# Patient Record
Sex: FEMALE | HISPANIC OR LATINO | Employment: STUDENT | URBAN - METROPOLITAN AREA
[De-identification: names, ages, dates, MRNs, and addresses within clinical notes are randomized per-mention and may not be internally consistent; named-entity substitution may affect disease eponyms.]

---
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
## 2022-10-15 PROCEDURE — 99285 EMERGENCY DEPT VISIT HI MDM: CPT | Mod: 25

## 2022-10-16 ENCOUNTER — HOSPITAL ENCOUNTER (EMERGENCY)
Facility: CLINIC | Age: 22
Discharge: HOME | End: 2022-10-16
Attending: EMERGENCY MEDICINE | Admitting: EMERGENCY MEDICINE
Payer: COMMERCIAL

## 2022-10-16 ENCOUNTER — APPOINTMENT (OUTPATIENT)
Dept: ULTRASOUND IMAGING | Facility: CLINIC | Age: 22
End: 2022-10-16
Attending: EMERGENCY MEDICINE
Payer: COMMERCIAL

## 2022-10-16 ENCOUNTER — APPOINTMENT (OUTPATIENT)
Dept: CT IMAGING | Facility: CLINIC | Age: 22
End: 2022-10-16
Attending: EMERGENCY MEDICINE
Payer: COMMERCIAL

## 2022-10-16 VITALS
DIASTOLIC BLOOD PRESSURE: 75 MMHG | HEART RATE: 60 BPM | SYSTOLIC BLOOD PRESSURE: 136 MMHG | RESPIRATION RATE: 16 BRPM | TEMPERATURE: 98.6 F | OXYGEN SATURATION: 99 %

## 2022-10-16 DIAGNOSIS — R10.31 ABDOMINAL PAIN, RIGHT LOWER QUADRANT: ICD-10-CM

## 2022-10-16 LAB
ALBUMIN SERPL BCG-MCNC: 4.2 G/DL (ref 3.5–5.2)
ALBUMIN UR-MCNC: NEGATIVE MG/DL
ALP SERPL-CCNC: 110 U/L (ref 35–104)
ALT SERPL W P-5'-P-CCNC: 21 U/L (ref 10–35)
ANION GAP SERPL CALCULATED.3IONS-SCNC: 10 MMOL/L (ref 7–15)
APPEARANCE UR: CLEAR
AST SERPL W P-5'-P-CCNC: 23 U/L (ref 10–35)
BILIRUB SERPL-MCNC: 0.2 MG/DL
BILIRUB UR QL STRIP: NEGATIVE
BUN SERPL-MCNC: 15.7 MG/DL (ref 6–20)
CALCIUM SERPL-MCNC: 9.1 MG/DL (ref 8.6–10)
CHLORIDE SERPL-SCNC: 106 MMOL/L (ref 98–107)
COLOR UR AUTO: NORMAL
CREAT SERPL-MCNC: 0.87 MG/DL (ref 0.51–0.95)
DEPRECATED HCO3 PLAS-SCNC: 24 MMOL/L (ref 22–29)
ERYTHROCYTE [DISTWIDTH] IN BLOOD BY AUTOMATED COUNT: 13.4 % (ref 10–15)
GFR SERPL CREATININE-BSD FRML MDRD: >90 ML/MIN/1.73M2
GLUCOSE SERPL-MCNC: 103 MG/DL (ref 70–99)
GLUCOSE UR STRIP-MCNC: NEGATIVE MG/DL
HCG SERPL QL: NEGATIVE
HCT VFR BLD AUTO: 42.5 % (ref 35–47)
HGB BLD-MCNC: 13.2 G/DL (ref 11.7–15.7)
HGB UR QL STRIP: NEGATIVE
HOLD SPECIMEN: NORMAL
KETONES UR STRIP-MCNC: NEGATIVE MG/DL
LEUKOCYTE ESTERASE UR QL STRIP: NEGATIVE
MCH RBC QN AUTO: 26.5 PG (ref 26.5–33)
MCHC RBC AUTO-ENTMCNC: 31.1 G/DL (ref 31.5–36.5)
MCV RBC AUTO: 85 FL (ref 78–100)
NITRATE UR QL: NEGATIVE
PH UR STRIP: 6 [PH] (ref 5–7)
PLATELET # BLD AUTO: 233 10E3/UL (ref 150–450)
POTASSIUM SERPL-SCNC: 3.9 MMOL/L (ref 3.4–5.3)
PROT SERPL-MCNC: 7.3 G/DL (ref 6.4–8.3)
RBC # BLD AUTO: 4.98 10E6/UL (ref 3.8–5.2)
RBC URINE: <1 /HPF
SODIUM SERPL-SCNC: 140 MMOL/L (ref 136–145)
SP GR UR STRIP: 1.03 (ref 1–1.03)
SQUAMOUS EPITHELIAL: 1 /HPF
UROBILINOGEN UR STRIP-MCNC: NORMAL MG/DL
WBC # BLD AUTO: 9.7 10E3/UL (ref 4–11)
WBC URINE: <1 /HPF

## 2022-10-16 PROCEDURE — 81001 URINALYSIS AUTO W/SCOPE: CPT | Performed by: EMERGENCY MEDICINE

## 2022-10-16 PROCEDURE — 84703 CHORIONIC GONADOTROPIN ASSAY: CPT | Performed by: EMERGENCY MEDICINE

## 2022-10-16 PROCEDURE — 85027 COMPLETE CBC AUTOMATED: CPT | Performed by: EMERGENCY MEDICINE

## 2022-10-16 PROCEDURE — 250N000009 HC RX 250: Performed by: EMERGENCY MEDICINE

## 2022-10-16 PROCEDURE — 80053 COMPREHEN METABOLIC PANEL: CPT | Performed by: EMERGENCY MEDICINE

## 2022-10-16 PROCEDURE — 36415 COLL VENOUS BLD VENIPUNCTURE: CPT | Performed by: EMERGENCY MEDICINE

## 2022-10-16 PROCEDURE — 250N000011 HC RX IP 250 OP 636: Performed by: EMERGENCY MEDICINE

## 2022-10-16 PROCEDURE — 74177 CT ABD & PELVIS W/CONTRAST: CPT

## 2022-10-16 PROCEDURE — 76830 TRANSVAGINAL US NON-OB: CPT

## 2022-10-16 RX ORDER — IOPAMIDOL 755 MG/ML
500 INJECTION, SOLUTION INTRAVASCULAR ONCE
Status: COMPLETED | OUTPATIENT
Start: 2022-10-16 | End: 2022-10-16

## 2022-10-16 RX ADMIN — SODIUM CHLORIDE 65 ML: 9 INJECTION, SOLUTION INTRAVENOUS at 03:06

## 2022-10-16 RX ADMIN — IOPAMIDOL 100 ML: 755 INJECTION, SOLUTION INTRAVENOUS at 03:06

## 2022-10-16 ASSESSMENT — ACTIVITIES OF DAILY LIVING (ADL): ADLS_ACUITY_SCORE: 35

## 2022-10-17 ASSESSMENT — ENCOUNTER SYMPTOMS
VOMITING: 0
FLANK PAIN: 0
DIARRHEA: 0
ABDOMINAL PAIN: 1
COUGH: 0
FEVER: 0
SHORTNESS OF BREATH: 0
DYSURIA: 0
NAUSEA: 0
CHILLS: 0

## 2022-10-17 NOTE — ED PROVIDER NOTES
History     Chief Complaint:  Abdominal Pain      HPI   Kelsi Bennett is a 22 year old female who presents with chief complaint right lower quadrant abdominal pain.  Patient reports symptoms began a few hours ago.  She denies similar pain in the past.  She describes pain as sharp and squeezing.  Denies nausea, vomiting, diarrhea.  Denies vaginal bleeding.  Denies vaginal discharge.  Last period is unknown as she has an IUD in.  No fever/chills.  No other complaints.    Review of Systems   Constitutional: Negative for chills and fever.   HENT: Negative for congestion.    Respiratory: Negative for cough and shortness of breath.    Cardiovascular: Negative for chest pain.   Gastrointestinal: Positive for abdominal pain. Negative for diarrhea, nausea and vomiting.   Genitourinary: Negative for dysuria, flank pain, pelvic pain, vaginal bleeding and vaginal discharge.   All other systems reviewed and are negative.        Allergies:  No Known Allergies    Medications:    levonorgestrel (KYLEENA) 19.5 MG IUD         Past Medical History:   Past Surgical History:     No significant pmhx Past Surgical History:   Procedure Laterality Date     NO HISTORY OF SURGERY        Patient Active Problem List    Diagnosis Date Noted     IUD (intrauterine device) in place - Kyleena - inserted 6/2020 08/05/2021     Priority: Medium     Morbid obesity (H) 08/05/2021     Priority: Medium          Family History  Family History   Adopted: Yes   Problem Relation Age of Onset     No Known Problems Father      No Known Problems Mother      No Known Problems Maternal Grandmother      No Known Problems Maternal Grandfather      No Known Problems Paternal Grandmother      No Known Problems Other        Social History:  PCP: Magdalena Carson  Presents to the ED with parents  Nonsmoker/nondrinker.     Physical Exam       Physical Exam  Nursing note and vitals reviewed.  HENT:   Mouth/Throat: Moist mucous membranes.   Eyes: EOMI, nonicteric  sclera  Cardiovascular: Normal rate, regular rhythm, no murmurs, rubs, or gallops  Pulmonary/Chest: Effort normal and breath sounds normal. No respiratory distress. No wheezes. No rales.   Abdominal: Soft. TTP RLQ, nondistended, no guarding or rigidity.   Musculoskeletal: Normal range of motion.   Neurological: Alert. Moves all extremities spontaneously.   Skin: Skin is warm and dry. No rash noted.   Psychiatric: Normal mood and affect.       Emergency Department Course     Imaging:  US Pelvis Cmplt w Transvag & Doppler LmtPel Duplex Limited   Final Result   IMPRESSION:     1.  Normal pelvis with an IUD in place.               Abd/pelvis CT,  IV  contrast only TRAUMA / AAA   Final Result   IMPRESSION:    1.  No acute abnormality. No appendicitis or other bowel inflammation or obstruction.          Laboratory:  Labs Ordered and Resulted from Time of ED Arrival to Time of ED Departure   CBC WITH PLATELETS - Abnormal       Result Value    WBC Count 9.7      RBC Count 4.98      Hemoglobin 13.2      Hematocrit 42.5      MCV 85      MCH 26.5      MCHC 31.1 (*)     RDW 13.4      Platelet Count 233     COMPREHENSIVE METABOLIC PANEL - Abnormal    Sodium 140      Potassium 3.9      Chloride 106      Carbon Dioxide (CO2) 24      Anion Gap 10      Urea Nitrogen 15.7      Creatinine 0.87      Calcium 9.1      Glucose 103 (*)     Alkaline Phosphatase 110 (*)     AST 23      ALT 21      Protein Total 7.3      Albumin 4.2      Bilirubin Total 0.2      GFR Estimate >90     ROUTINE UA WITH MICROSCOPIC REFLEX TO CULTURE - Normal    Color Urine Light Yellow      Appearance Urine Clear      Glucose Urine Negative      Bilirubin Urine Negative      Ketones Urine Negative      Specific Gravity Urine 1.034      Blood Urine Negative      pH Urine 6.0      Protein Albumin Urine Negative      Urobilinogen Urine Normal      Nitrite Urine Negative      Leukocyte Esterase Urine Negative      RBC Urine <1      WBC Urine <1      Squamous  Epithelials Urine 1     HCG QUALITATIVE PREGNANCY - Normal    hCG Serum Qualitative Negative           Emergency Department Course:         Reviewed:    I reviewed nursing notes, vitals, past history and care everywhere    Assessments/Consults:   The patient arrived in triage where vitals were measured and recorded.   The patient was then escorted back to the emergency department.   The patient's medical records were reviewed.  Nursing notes and vitals were reviewed.    I performed an exam of the patient as documented above. The patient is in agreement with my plan of care.       Interventions:  Medications   iopamidol (ISOVUE-370) solution 500 mL (100 mLs Intravenous Given 10/16/22 0306)   Sodium Chloride for CT Scan Flush Use (65 mLs Intravenous Given 10/16/22 0306)       Disposition:  The patient was discharged to home.    Impression & Plan                Medical Decision Making:  This patient presented to the Emergency Department with Abdominal Pain.  The clinical exam today is non-specific and non-focal and non-surgical.  Labs unremarkable.  CT/pelvic ultrasound also normal.  The exact etiology of the abdominal pain is not clear.  The differential diagnosis of abdominal pain is broad and includes: Appendicitis, Bowel Obstruction, Ulcer, Ischemia, Cholecystitis, Diverticulitis, Pancreatitis, UTI, Pyelonephritis, Enteritis/Colitis, AAA, ectopic pregnancy, ovarian torsion, amongst many other etiologies.  The history, physical exam, and results detect no life threatening cause at this time, nor do they indicate the patient is currently suffering from one of the previously mentioned conditions.  Unfortunately a clear exam and results today do not ensure freedom from a severe disease process in the future-- even within hours, or the possibility that there is a dangerous process currently at work but currently undetected or undiagnosed.  For this reason the patient is advised to seek immediate re-evaluation in the the  ED if there is a worsening of the condition, and to be seen by a more consistent care-giver, such as their PMD, if the symptoms persist more than 1-2 days.  She is in stable condition at the time of discharge, indications for return to the ED were discussed as well as follow up. All questions were answered and she is in agreement with the plan.      Covid-19  Kelsi Bennett was evaluated during a global COVID-19 pandemic, which necessitated consideration that the patient might be at risk for infection with the SARS-CoV-2 virus that causes COVID-19.  Applicable protocols for evaluation were followed during the patient's care. COVID-19 was considered as part of the patient's evaluation.       Diagnosis:    ICD-10-CM    1. Abdominal pain, right lower quadrant  R10.31              Tiago Herndon MD  10/17/22 0243

## 2022-11-22 ENCOUNTER — VIRTUAL VISIT (OUTPATIENT)
Dept: FAMILY MEDICINE | Facility: CLINIC | Age: 22
End: 2022-11-22
Payer: COMMERCIAL

## 2022-11-22 DIAGNOSIS — R05.3 PERSISTENT DRY COUGH: Primary | ICD-10-CM

## 2022-11-22 PROCEDURE — 99213 OFFICE O/P EST LOW 20 MIN: CPT | Mod: 95 | Performed by: FAMILY MEDICINE

## 2022-11-22 RX ORDER — BENZONATATE 100 MG/1
100 CAPSULE ORAL 3 TIMES DAILY PRN
Qty: 42 CAPSULE | Refills: 0 | Status: SHIPPED | OUTPATIENT
Start: 2022-11-22 | End: 2023-04-28

## 2022-11-22 NOTE — PROGRESS NOTES
Kelsi is a 22 year old who is being evaluated via a billable video visit.      How would you like to obtain your AVS? MyChart  If the video visit is dropped, the invitation should be resent by: Send to e-mail at: [email or web address]  Will anyone else be joining your video visit? No          Assessment & Plan     Persistent dry cough  - patient and mother advised current cough is very unlikely related to TB. Nair tart on trial of tessalon perles. RTC for non-resolution of symptoms   - benzonatate (TESSALON) 100 MG capsule; Take 1 capsule (100 mg) by mouth 3 times daily as needed for cough               Magi Glasgow MD  [facility]   Kelsi is a 22 year old accompanied by her mother, presenting for the following health issues:  Cough           Acute Illness  Acute illness concerns: persistent dry cough  Onset/Duration: 10 days ago  Symptoms:  Fever: No  Chills/Sweats: No  Headache (location?): No  Sinus Pressure: No  Conjunctivitis:  No  Ear Pain: no  Rhinorrhea: YES  Congestion: YES  Sore Throat: No  Cough: YES-non-productive  Wheeze: No  Decreased Appetite: No  Nausea: No  Vomiting: No  Diarrhea: No  Dysuria/Freq.: No  Dysuria or Hematuria: No  Fatigue/Achiness: No  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 Everyone in her family is also recovering from the flu.  Mom is worried her cough could be tuberculosis. As a child she was treated for latent TB. No recent known exposure to TB.   Denies any night sweats, fever, chills    Review of Systems   Constitutional, HEENT, cardiovascular, pulmonary, GI, , musculoskeletal, neuro, skin, endocrine and psych systems are negative, except as otherwise noted.      Objective           Vitals:  No vitals  were obtained today due to virtual visit.    Physical Exam   GENERAL: Healthy, alert and no distress  EYES: Eyes grossly normal to inspection.  No discharge or erythema, or obvious scleral/conjunctival abnormalities.  RESP: No audible wheeze, cough, or visible cyanosis.  No visible retractions or increased work of breathing.    PSYCH: Mentation appears normal, affect normal/bright, judgement and insight intact, normal speech and appearance well-groomed.            Video-Visit Details    Video Start Time: 5:01 PM    Type of service:  Video Visit    Video End Time:5:08 PM    Originating Location (pt. Location): Home        Distant Location (provider location):  Off-site    Platform used for Video Visit: [employer]Well

## 2022-11-23 ENCOUNTER — OFFICE VISIT (OUTPATIENT)
Dept: MIDWIFE SERVICES | Facility: CLINIC | Age: 22
End: 2022-11-23
Payer: COMMERCIAL

## 2022-11-23 VITALS — BODY MASS INDEX: 41.08 KG/M2 | DIASTOLIC BLOOD PRESSURE: 62 MMHG | WEIGHT: 221 LBS | SYSTOLIC BLOOD PRESSURE: 116 MMHG

## 2022-11-23 DIAGNOSIS — Z30.011 ENCOUNTER FOR INITIAL PRESCRIPTION OF CONTRACEPTIVE PILLS: ICD-10-CM

## 2022-11-23 DIAGNOSIS — Z30.432 ENCOUNTER FOR REMOVAL OF INTRAUTERINE CONTRACEPTIVE DEVICE: Primary | ICD-10-CM

## 2022-11-23 PROBLEM — Z97.5 IUD (INTRAUTERINE DEVICE) IN PLACE: Status: RESOLVED | Noted: 2021-08-05 | Resolved: 2022-11-23

## 2022-11-23 PROCEDURE — 58301 REMOVE INTRAUTERINE DEVICE: CPT | Performed by: ADVANCED PRACTICE MIDWIFE

## 2022-11-23 PROCEDURE — 99202 OFFICE O/P NEW SF 15 MIN: CPT | Mod: 25 | Performed by: ADVANCED PRACTICE MIDWIFE

## 2022-11-23 RX ORDER — NORETHINDRONE ACETATE AND ETHINYL ESTRADIOL 1MG-20(21)
1 KIT ORAL DAILY
Qty: 84 TABLET | Refills: 3 | Status: SHIPPED | OUTPATIENT
Start: 2022-11-23 | End: 2023-10-18

## 2022-11-23 NOTE — NURSING NOTE
"Chief Complaint   Patient presents with     IUD     Removal-has had irregular light bleeding     Contraception     Would like to be prescribed OCP       Initial /62 (BP Location: Left arm, Cuff Size: Adult Regular)   Wt 100.2 kg (221 lb)   BMI 41.08 kg/m   Estimated body mass index is 41.08 kg/m  as calculated from the following:    Height as of 23: 1.562 m (5' 1.5\").    Weight as of this encounter: 100.2 kg (221 lb).  BP completed using cuff size: regular    Questioned patient about current smoking habits.  Pt. has never smoked.            "

## 2022-11-23 NOTE — PATIENT INSTRUCTIONS
Birth Control Pills    Combination birth control pills contain both estrogen and progestin.  There are numerous brands of birth control pills otherwise known as oral contraceptive pills (OCP's).  Each brand has a different combination of estrogen and progestin so every woman can find the one that is right for her.  OCP's are a safe and effective way to prevent pregnancy in most women.    How do OCP's work  OCP's work by several different mechanisms.  They cause changes in the cervix and the lining of the uterus.  The cervical mucus becomes thicker which will prevent the sperm from entering the cervix.  The lining of the uterus becomes thin which helps prevent an egg from attaching to it.  In combination, these events make it unlikely that you will get pregnant. It may also prevent ovulation completely.    Benefits of OCP's  May reduce your risk of:  Cancer of the uterus and ovary, ovarian cysts, pelvic infection, bone loss, benign breast disease, anemia, ectopic pregnancy and acne.  It may also decrease symptoms of PCOS (Polycystic Ovarian Syndrome). OCP's may also improve cramping during menstrual cycle and may make you cycle shorter and lighter.    How to take OCP's  You have several choices on how to start taking your OCP's:  You can start the pill on the first day of your next period  You can start the pill on the Sunday after your next period starts  You can start the pill on the first day it was prescribed no matter where you are in your cycle.  In this case, you will need to make sure you are not pregnant.    No matter when you start your first pack, you will always start your next pack on the same day you started your first pack.    You should take the pill at the same time every day.  Do not skip any pills.  If you miss any pills, are taking antibiotics or vomit, use a backup method of birth control until you get your next period.    Pills come in packs of 21, 28 or 91 pills:    21 Pills:  Take one pill at  the same time every day for 21 days.  Wait 7 days before beginning your next pack.  During these 7 days you will have your period.  28 Pills:  Take one pill at the same time every day for 28 days.  The last 7 pills in the pack do not contain estrogen/progestin.  During these 7 days you will have your period.    91 Pills:  Take one pill at the same time every day for 91 days.  The last 7 pills in the pack do not contain estrogen/progestin.  During these 7 days you will have your period.  With this method you will only have 4 periods a year.  Some women eventually have no bleeding at all.    Each pill pack comes with instructions.  Please make sure you read them and understand these instructions.      What to do if you miss a pill    Occasionally you may forget to take a pill or not take it on time.  Take the missed pill as soon as you remember.  Take the next pill at the regular time.  It is ok if you take two pills in one day.  You may feel a bit queasy or have some spotting, this is normal and should not be concerning.  If you have missed more than one pill use a back up method of birth control and call the clinic for instructions on how to proceed.    Who should not take Combined OCP's  If you have a history or have blood clots  A history of cerebral vascular accident (stroke)  If you have ischemic heart or coronary artery disease  Known of suspected breast cancer  Known or suspected pregnancy  Smoker and over age 35  Any know liver abnormality  Migraine headaches with an aura  Undiagnosed abnormal vaginal bleeding  High blood pressure    Common side effects when starting OCP's  Headache, nausea, dizziness, breakthrough bleeding, missed periods, tender breasts, depression and anxiety.  Most side effects are minor and resolve in the first few months. Take the pill with meals or at bedtime if nausea occurs.    Call or return for care in the following circumstances:    Unexpected missed periods or very heavy  bleeding  Persistent vaginal bleeding  Depression  Suspected pregnancy  Persistent side effects such as:  Nausea, irregular menses or mood changes.    Seek emergency care immediately for the following:  ACHES  Abdominal or pelvic pain  Chest pain  Severe headache   Visual disturbances  Severe leg pain or numbness or tingling of extremities    Lastly-  Use of a backup method is recommended for the first cycle  Condoms are recommended to protect against STI's  OCP's are 99% effective if take correctly.  The pill helps to keep your periods regular, lighter and shorter and reduces cramps.  If you desire a pregnancy, you may stop taking your OCPs.     Please call the clinic with questions and concerns  [facility]  [phone]

## 2022-11-23 NOTE — PROGRESS NOTES
IUD Removal:  SUBJECTIVE:    Is a pregnancy test required: No.  Was a consent obtained?  Yes    Kelsi Bennett is a 22 year old female,, No LMP recorded. (Menstrual status: IUD). who presents today for IUD removal. Her current IUD was placed 2yrs ago. She has had problems including consistent spotting and severe cramping with the IUD. She requests removal of the IUD because of problems stated above    Today's PHQ-2 Score:   PHQ-2 (  [employer]) 2022   Q1: Little interest or pleasure in doing things 0   Q2: Feeling down, depressed or hopeless 0   PHQ-2 Score 0   PHQ-2 Total Score (12-17 Years)- Positive if 3 or more points; Administer PHQ-A if positive -   Q1: Little interest or pleasure in doing things Not at all   Q2: Feeling down, depressed or hopeless Not at all   PHQ-2 Score 0       PROCEDURE:    A speculum exam was performed and the cervix was visualized. The IUD string was visualized. Using ring forceps, the string  was grasped and the IUD removed intact.    POST PROCEDURE:    The patient tolerated the procedure well. Patient was discharged in stable condition.    Call if bleeding, pain or fever occur. and Birth control counseling given.      1. (Z30.432) Encounter for removal of intrauterine contraceptive device  (primary encounter diagnosis)  Plan: REMOVE INTRAUTERINE DEVICE      2. (Z30.011) Encounter for initial prescription of contraceptive pills  Plan: norethindrone-ethinyl estradiol (JUNEL FE 1/20)        1-20 MG-MCG tablet    The use of the oral contraceptive pill has been fully discussed with the patient. This includes the proper method to initiate  and continue the pill, the need for regular compliance to ensure adequate contraceptive effect, the physiology which makes the pill effective, the instructions for what to do in event of a missed pill, and warnings about anticipated minor side effects such as breakthrough spotting, nausea, breast tenderness, weight changes, acne,  headaches, etc. She was informed of the irregular bleeding pattern that can occur when the pill is first started or a new form is changed over for the first 2-3 months.  She has been told of the more serious potential side effects such as MI, stroke, and deep vein thrombosis, all of which are very unlikely.  She has been asked to report any signs of such serious problems immediately.   She understands and wishes to take the medication as prescribed.      Carolyn Rush CNM

## 2022-11-30 ENCOUNTER — E-VISIT (OUTPATIENT)
Dept: URGENT CARE | Facility: CLINIC | Age: 22
End: 2022-11-30
Payer: COMMERCIAL

## 2022-11-30 DIAGNOSIS — H10.11 ALLERGIC CONJUNCTIVITIS OF RIGHT EYE: Primary | ICD-10-CM

## 2022-11-30 PROCEDURE — 99421 OL DIG E/M SVC 5-10 MIN: CPT | Performed by: NURSE PRACTITIONER

## 2022-11-30 RX ORDER — POLYMYXIN B SULFATE AND TRIMETHOPRIM 1; 10000 MG/ML; [USP'U]/ML
1-2 SOLUTION OPHTHALMIC EVERY 6 HOURS
Qty: 10 ML | Refills: 0 | Status: SHIPPED | OUTPATIENT
Start: 2022-11-30 | End: 2022-12-07

## 2022-12-01 NOTE — PATIENT INSTRUCTIONS
Thank you for choosing us for your care. I have placed an order for a prescription so that you can start treatment. View your full visit summary for details by clicking on the link below. Your pharmacist will able to address any questions you may have about the medication.     If you re not feeling better within 2-3 days, please schedule an appointment.  You can schedule an appointment right here in [facility], or call [phone]  If the visit is for the same symptoms as your eVisit, we ll refund the cost of your eVisit if seen within seven days.      Conjunctivitis, Nonspecific    The membrane that covers the white part of your eye (the conjunctiva) is inflamed. Inflammation happens when your body responds to an injury, allergic reaction, infection, or illness. Symptoms of inflammation in the eye may include redness, irritation, itching, swelling, or burning. These symptoms should go away within the next 24 hours. Conjunctivitis may be related to a particle that was in your eye. If so, it may wash out with your tears or irrigation treatment. Being exposed to liquid chemicals or fumes may also cause this reaction.    Home care    Put a cold pack on the eye for 20 minutes at a time. This will reduce pain. To make a cold pack, put ice cubes in a plastic bag that seals at the top. Wrap the bag in a clean, thin towel or cloth.    Artificial tears may be prescribed to reduce irritation or redness. These should be used 3 to 4 times a day.    You may use acetaminophen or ibuprofen to control pain, unless another medicine was prescribed. If you have chronic liver or kidney disease, talk with your healthcare provider before using these medicines. Also talk with your provider if you have ever had a stomach ulcer or gastrointestinal bleeding.    Follow-up care  Follow up with your healthcare provider, or as advised.  When to seek medical advice  Call your healthcare provider right away if any of these occur:    Eyelid swells  more    Eye pain gets worse    Redness or drainage from the eye gets worse    Blurry vision gets worse or you have increased sensitivity to light    Normal vision does not return within 24 to 48 hours  [employer] last reviewed this educational content on 2/1/2020 2000-2021 [employer]. All rights reserved. This information is not intended as a substitute for professional medical care. Always follow your healthcare professional's instructions.

## 2022-12-26 ENCOUNTER — HEALTH MAINTENANCE LETTER (OUTPATIENT)
Age: 22
End: 2022-12-26

## 2023-02-22 ENCOUNTER — TELEPHONE (OUTPATIENT)
Dept: FAMILY MEDICINE | Facility: CLINIC | Age: 23
End: 2023-02-22
Payer: COMMERCIAL

## 2023-02-22 PROBLEM — R19.7 DIARRHEA, UNSPECIFIED TYPE: Status: RESOLVED | Noted: 2021-08-05 | Resolved: 2023-02-22

## 2023-02-22 NOTE — TELEPHONE ENCOUNTER
Patient Quality Outreach    Patient is due for the following:   Physical Preventive Adult Physical  Chlamydia Screening      Topic Date Due     Hepatitis B Vaccine (2 of 3 - 3-dose series) 09/23/2003     COVID-19 Vaccine (4 - Booster for Moderna series) 12/25/2021       Next Steps:   Schedule a Adult Preventative    Type of outreach:    Sent Zong message.    Next Steps:  Reach out within 90 days via Letter.    Max number of attempts reached: No. Will try again in 90 days if patient still on fail list.    Questions for provider review:    None     Arianna Colin, CMA

## 2023-04-10 ENCOUNTER — VIRTUAL VISIT (OUTPATIENT)
Dept: FAMILY MEDICINE | Facility: CLINIC | Age: 23
End: 2023-04-10
Payer: COMMERCIAL

## 2023-04-10 DIAGNOSIS — R07.0 THROAT PAIN: Primary | ICD-10-CM

## 2023-04-10 PROCEDURE — 99213 OFFICE O/P EST LOW 20 MIN: CPT | Mod: VID | Performed by: STUDENT IN AN ORGANIZED HEALTH CARE EDUCATION/TRAINING PROGRAM

## 2023-04-10 NOTE — PROGRESS NOTES
Kelsi is a 22 year old who is being evaluated via a billable video visit.      How would you like to obtain your AVS? MyChart  If the video visit is dropped, the invitation should be resent by: Text to cell phone: [phone]  Will anyone else be joining your video visit? No          Assessment & Plan     Throat pain  Low suspicion for strep given 0 points on Centor criteria, however given patient has had multiple exposures, will test for strep.  - Streptococcus A Rapid Screen w/Reflex to PCR - Clinic Collect      Review of external notes as documented elsewhere in note        Iveth Ham MD  [facility]    Subjective   Kelsi is a 22 year old, presenting for the following health issues:  office visit (Sore throat and cough)        4/10/2023     4:52 PM   Additional Questions   Roomed by    Accompanied by self     History of Present Illness       Reason for visit:  Sore Throat and slight cough  Symptom onset:  1-3 days ago  Symptoms include:  Sore throat and cough  Symptom intensity:  Severe  Symptom progression:  Worsening  Had these symptoms before:  No  What makes it worse:  No  What makes it better:  No    She eats 2-3 servings of fruits and vegetables daily.She consumes 0 sweetened beverage(s) daily.She exercises with enough effort to increase her heart rate 60 or more minutes per day.  She exercises with enough effort to increase her heart rate 5 days per week.   She is taking medications regularly.       Patient reports sore throat for few days.  Associated with mild cough.  Believes she may have swollen lymph nodes, unsure.  Denies any difficulty breathing or eating.  Denies fever.  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      Review of Systems   Constitutional: Negative for fever and chills.   Respiratory: Negative for shortness of breath.    Cardiovascular: Negative for chest pain or chest pressure.   Gastrointestinal: Negative for nausea,  vomiting, diarrhea or abdominal pain.        Objective    Vitals - Patient Reported  Temperature (Patient Reported): 97.1  F (36.2  C)        Physical Exam   GENERAL: Healthy, alert and no distress  EYES: Eyes grossly normal to inspection.  No discharge or erythema, or obvious scleral/conjunctival abnormalities.  RESP: No audible wheeze, cough, or visible cyanosis.  No visible retractions or increased work of breathing.    SKIN: Visible skin clear. No significant rash, abnormal pigmentation or lesions.  NEURO: Cranial nerves grossly intact.  Mentation and speech appropriate for age.  PSYCH: Mentation appears normal, affect normal/bright, judgement and insight intact, normal speech and appearance well-groomed.            Video-Visit Details    Type of service:  Video Visit     Originating Location (pt. Location): Home  Distant Location (provider location):  On-site  Platform used for Video Visit: Monica

## 2023-04-11 ENCOUNTER — LAB (OUTPATIENT)
Dept: FAMILY MEDICINE | Facility: CLINIC | Age: 23
End: 2023-04-11
Attending: STUDENT IN AN ORGANIZED HEALTH CARE EDUCATION/TRAINING PROGRAM
Payer: COMMERCIAL

## 2023-04-11 LAB
DEPRECATED S PYO AG THROAT QL EIA: NEGATIVE
GROUP A STREP BY PCR: NOT DETECTED

## 2023-04-11 PROCEDURE — 87651 STREP A DNA AMP PROBE: CPT | Mod: VID | Performed by: STUDENT IN AN ORGANIZED HEALTH CARE EDUCATION/TRAINING PROGRAM

## 2023-04-11 NOTE — PROGRESS NOTES
Patient in for Strep testing basted off of Virtual Visit and orders placed per Dr. Ham.  Constantino BRADLEY CMA ([facility])

## 2023-04-27 ASSESSMENT — ENCOUNTER SYMPTOMS
SORE THROAT: 0
PARESTHESIAS: 0
CHILLS: 0
SHORTNESS OF BREATH: 0
ABDOMINAL PAIN: 0
FREQUENCY: 0
PALPITATIONS: 0
NERVOUS/ANXIOUS: 0
FEVER: 0
NAUSEA: 0
BREAST MASS: 0
DIZZINESS: 0
HEADACHES: 0
CONSTIPATION: 0
WEAKNESS: 0
DIARRHEA: 0
JOINT SWELLING: 0
COUGH: 0
HEMATURIA: 0
HEMATOCHEZIA: 0
DYSURIA: 0
MYALGIAS: 0
ARTHRALGIAS: 0
EYE PAIN: 0
HEARTBURN: 0

## 2023-04-28 ENCOUNTER — OFFICE VISIT (OUTPATIENT)
Dept: FAMILY MEDICINE | Facility: CLINIC | Age: 23
End: 2023-04-28
Payer: COMMERCIAL

## 2023-04-28 VITALS
HEIGHT: 61 IN | SYSTOLIC BLOOD PRESSURE: 108 MMHG | HEART RATE: 89 BPM | TEMPERATURE: 98.1 F | DIASTOLIC BLOOD PRESSURE: 70 MMHG | WEIGHT: 216 LBS | BODY MASS INDEX: 40.78 KG/M2 | RESPIRATION RATE: 18 BRPM

## 2023-04-28 DIAGNOSIS — Z11.3 SCREENING FOR STDS (SEXUALLY TRANSMITTED DISEASES): ICD-10-CM

## 2023-04-28 DIAGNOSIS — H92.01 OTALGIA, RIGHT: ICD-10-CM

## 2023-04-28 DIAGNOSIS — E66.01 MORBID OBESITY (H): ICD-10-CM

## 2023-04-28 DIAGNOSIS — Z00.00 ROUTINE GENERAL MEDICAL EXAMINATION AT A HEALTH CARE FACILITY: Primary | ICD-10-CM

## 2023-04-28 DIAGNOSIS — Z30.41 ENCOUNTER FOR SURVEILLANCE OF CONTRACEPTIVE PILLS: ICD-10-CM

## 2023-04-28 DIAGNOSIS — Z02.82 ADOPTED PERSON: ICD-10-CM

## 2023-04-28 PROBLEM — Z78.9 ADOPTED PERSON: Status: ACTIVE | Noted: 2023-04-28

## 2023-04-28 PROCEDURE — 87591 N.GONORRHOEAE DNA AMP PROB: CPT | Performed by: PHYSICIAN ASSISTANT

## 2023-04-28 PROCEDURE — 99395 PREV VISIT EST AGE 18-39: CPT | Performed by: PHYSICIAN ASSISTANT

## 2023-04-28 PROCEDURE — 87491 CHLMYD TRACH DNA AMP PROBE: CPT | Performed by: PHYSICIAN ASSISTANT

## 2023-04-28 ASSESSMENT — ENCOUNTER SYMPTOMS
ARTHRALGIAS: 0
ABDOMINAL PAIN: 0
MYALGIAS: 0
JOINT SWELLING: 0
HEADACHES: 0
WEAKNESS: 0
HEMATOCHEZIA: 0
COUGH: 0
FEVER: 0
PARESTHESIAS: 0
HEMATURIA: 0
SORE THROAT: 0
CHILLS: 0
DYSURIA: 0
NERVOUS/ANXIOUS: 0
PALPITATIONS: 0
DIARRHEA: 0
CONSTIPATION: 0
HEARTBURN: 0
DIZZINESS: 0
FREQUENCY: 0
SHORTNESS OF BREATH: 0
EYE PAIN: 0
NAUSEA: 0
BREAST MASS: 0

## 2023-04-28 NOTE — PROGRESS NOTES
SUBJECTIVE:   CC: Kelsi is an 22 year old who presents for preventive health visit.       4/28/2023     7:19 AM   Additional Questions   Roomed by Magdalena Degroot MA   Accompanied by self   Patient has been advised of split billing requirements and indicates understanding: Yes  Healthy Habits:     Getting at least 3 servings of Calcium per day:  Yes    Bi-annual eye exam:  NO    Dental care twice a year:  Yes    Sleep apnea or symptoms of sleep apnea:  None    Diet:  Regular (no restrictions)    Frequency of exercise:  4-5 days/week    Duration of exercise:  45-60 minutes    Taking medications regularly:  Yes    Medication side effects:  None    PHQ-2 Total Score: 0    Additional concerns today:  No    On OCPs for contraception and menorrhagia. Saw OB/GYN for this. New sexual partner x2 months. Male partner. Denies concern for STD, but is amenable to screening. Asymptomatic.   Non-smoker    Exercising 1.5 hours daily - cycling and weight lifting     Not fasting - mild elevation of lipids 2021. Nl glucose.    R ear pain x1 week. Suffering with allergies and not sure if this is the cause. No drainage, fever or hx of AOM. No treatment to date.       Today's PHQ-2 Score:       4/27/2023     8:30 AM   PHQ-2 ( 1999 [employer])   Q1: Little interest or pleasure in doing things 0   Q2: Feeling down, depressed or hopeless 0   PHQ-2 Score 0   Q1: Little interest or pleasure in doing things Not at all    Not at all   Q2: Feeling down, depressed or hopeless Not at all    Not at all   PHQ-2 Score 0    0       Social History     Tobacco Use     Smoking status: Never     Smokeless tobacco: Never   Vaping Use     Vaping status: Never Used   Substance Use Topics     Alcohol use: Not Currently     Alcohol/week: 1.0 standard drink of alcohol     Types: 1 Standard drinks or equivalent per week     Comment: seldom - 1-2 montly             4/27/2023     8:30 AM   Alcohol Use   Prescreen: >3 drinks/day or >7 drinks/week? Not Applicable      Reviewed orders with patient.  Reviewed health maintenance and updated orders accordingly - Yes  Patient Active Problem List   Diagnosis     Morbid obesity (H)     Past Surgical History:   Procedure Laterality Date     NO HISTORY OF SURGERY         Social History     Tobacco Use     Smoking status: Never     Smokeless tobacco: Never   Vaping Use     Vaping status: Never Used   Substance Use Topics     Alcohol use: Not Currently     Alcohol/week: 1.0 standard drink of alcohol     Types: 1 Standard drinks or equivalent per week     Comment: seldom - 1-2 montly     Family History   Adopted: Yes   Problem Relation Age of Onset     No Known Problems Father      No Known Problems Mother      No Known Problems Maternal Grandmother      No Known Problems Maternal Grandfather      No Known Problems Paternal Grandmother      No Known Problems Other          Current Outpatient Medications   Medication Sig Dispense Refill     norethindrone-ethinyl estradiol (JUNEL FE 1/20) 1-20 MG-MCG tablet Take 1 tablet by mouth daily 84 tablet 3     No Known Allergies    Breast Cancer Screening:        History of abnormal Pap smear: NO - under age 21, PAP not appropriate for age     Reviewed and updated as needed this visit by clinical staff   Tobacco  Allergies  Meds  Problems    Fam Hx          Reviewed and updated as needed this visit by Provider   Tobacco  Allergies  Meds  Problems    Fam Hx             Review of Systems   Constitutional: Negative for chills and fever.   HENT: Positive for congestion and ear pain. Negative for hearing loss and sore throat.    Eyes: Negative for pain and visual disturbance.   Respiratory: Negative for cough and shortness of breath.    Cardiovascular: Negative for chest pain, palpitations and peripheral edema.   Gastrointestinal: Negative for abdominal pain, constipation, diarrhea, heartburn, hematochezia and nausea.   Breasts:  Negative for tenderness, breast mass and discharge.  "  Genitourinary: Negative for dysuria, frequency, genital sores, hematuria, pelvic pain, urgency, vaginal bleeding and vaginal discharge.   Musculoskeletal: Negative for arthralgias, joint swelling and myalgias.   Skin: Negative for rash.   Neurological: Negative for dizziness, weakness, headaches and paresthesias.   Psychiatric/Behavioral: Negative for mood changes. The patient is not nervous/anxious.         OBJECTIVE:   /70   Pulse 89   Temp 98.1  F (36.7  C)   Resp 18   Ht 1.549 m (5' 1\")   Wt 98 kg (216 lb)   PF 98 L/min   BMI 40.81 kg/m    Physical Exam  GENERAL: healthy, alert and no distress  EYES: Eyes grossly normal to inspection, PERRL and conjunctivae and sclerae normal  HENT: ear canals and TM's normal, nose and mouth without ulcers or lesions  NECK: no adenopathy, no asymmetry, masses, or scars and thyroid normal to palpation  RESP: lungs clear to auscultation - no rales, rhonchi or wheezes  BREAST: normal without masses, tenderness or nipple discharge and no palpable axillary masses or adenopathy  CV: regular rate and rhythm, normal S1 S2, no S3 or S4, no murmur, click or rub, no peripheral edema and peripheral pulses strong  ABDOMEN: soft, nontender, no hepatosplenomegaly, no masses and bowel sounds normal  MS: no gross musculoskeletal defects noted, no edema  SKIN: no suspicious lesions or rashes  NEURO: Normal strength and tone, mentation intact and speech normal  PSYCH: mentation appears normal, affect normal/bright    Diagnostic Test Results:  Labs reviewed in Epic    ASSESSMENT/PLAN:   Kelsi was seen today for physical.    Diagnoses and all orders for this visit:    Routine general medical examination at a health care facility  Adopted person  -     Reviewed preventative health recommendations for age. Discussed consideration to early screenings given Fhx unknown and pt amenable to this.  - REVIEW OF HEALTH MAINTENANCE PROTOCOL ORDERS  -     PRIMARY CARE FOLLOW-UP SCHEDULING; " Future    Encounter for surveillance of contraceptive pills   - Prescribed by OB/GYN and stable.    Screening for STDs (sexually transmitted diseases)  -     New partner x2 months, amenable to screening. Declines other STD tests.  - NEISSERIA GONORRHOEA PCR; Future  -     CHLAMYDIA TRACHOMATIS PCR; Future    Morbid obesity (H)   - Exercising daily with cycling and weight lifting at the gym. Encouraged ongoing efforts.    Otalgia, right   - No infection. Discussed possible ETD with trial of antihistamine and flonase in light of seasonal allergies.        COUNSELING:  Reviewed preventive health counseling, as reflected in patient instructions       Regular exercise       Healthy diet/nutrition       Immunizations    Pt will bring copy of previous hep b vaccine     Declines covid             Contraception       Safe sex practices/STD prevention       Colorectal Cancer Screening        She reports that she has never smoked. She has never used smokeless tobacco.      Loretta Capone PA-C  [facility] [place]

## 2023-04-29 LAB
C TRACH DNA SPEC QL NAA+PROBE: NEGATIVE
N GONORRHOEA DNA SPEC QL NAA+PROBE: NEGATIVE

## 2023-05-07 NOTE — RESULT ENCOUNTER NOTE
Dear Kelsi,      Your recent test results are noted below:    -Chlamydia and gonnohrea tests are normal.    For additional lab test information, [email or web address] is an excellent reference. Please contact the clinic at [phone] with any further questions or concerns.    Sincerely,      Loretta Capone PA-C  [facility]

## 2023-07-31 ENCOUNTER — NURSE TRIAGE (OUTPATIENT)
Dept: FAMILY MEDICINE | Facility: CLINIC | Age: 23
End: 2023-07-31
Payer: COMMERCIAL

## 2023-07-31 NOTE — TELEPHONE ENCOUNTER
Situation   Called patient about headaches, back pain or sore throat.     Background   Does not have headaches or maigranins normally  No HX of kidney stones   No chronic back pain    Covid test was negative today 7/31    Assessment     Moderate headache- less severe after OTC migraine medication with caffeine.     No head injury or falls     Constant left lower back pain for 3-5 days- constant moderate and is  sharp   Pain does not radiate down legs or into groin,   Headache pain does not go down arm.     No numbness or tingling down legs feet or toes. No extremity weakness     Frequent urination for the past 5 days -no blood in urine, denies burning, pain with urination, no urgency, no odourous or cloudy urine     Mild Sore throat - 4 days - redness  is present in the back of her throat.   No trouble swallowing.   No visible swelling    no nausea  No vomiting  No vision changes   No fever, no chills  Overall not feeling as well as normal.      Recommendations   Advised UC today, patient wants to be seen in clinic tomorrow.   Scheduled -advised of arrival time  Advised worsening symptoms before appointment UC or ER     Reason for Disposition   MODERATE back pain (e.g., interferes with normal activities) and present > 3 days   Patient wants to be seen    Additional Information   Negative: Difficult to awaken or acting confused (e.g., disoriented, slurred speech)   Negative: Weakness of the face, arm or leg on one side of the body and new-onset   Negative: Numbness of the face, arm or leg on one side of the body and new-onset   Negative: Loss of speech or garbled speech and new-onset   Negative: Passed out (i.e., fainted, collapsed and was not responding)   Negative: Sounds like a life-threatening emergency to the triager   Negative: Followed a head injury within last 3 days   Negative: Traumatic Brain Injury (TBI) is suspected   Negative: Sinus pain of forehead and yellow or green nasal discharge   Negative:  Pregnant   Negative: Unable to walk without falling   Negative: Stiff neck (can't touch chin to chest)   Negative: Possibility of carbon monoxide exposure   Negative: SEVERE headache, states 'worst headache' of life   Negative: SEVERE headache, sudden-onset (i.e., reaching maximum intensity within seconds to 1 hour)   Negative: Severe pain in one eye   Negative: Loss of vision or double vision  (Exception: Same as prior migraines.)   Negative: Patient sounds very sick or weak to the triager   Negative: Fever > 103 F (39.4 C)   Negative: Fever > 100.0 F (37.8 C) and has diabetes mellitus or a weak immune system (e.g., HIV positive, cancer chemotherapy, organ transplant, splenectomy, chronic steroids)   Negative: SEVERE headache (e.g., excruciating) and has had severe headaches before   Negative: SEVERE headache and not relieved by pain meds   Negative: SEVERE headache and vomiting   Negative: SEVERE headache and fever   Negative: New headache and weak immune system (e.g., HIV positive, cancer chemo, splenectomy, organ transplant, chronic steroids)   Negative: Fever present > 3 days (72 hours)   Negative: Passed out (i.e., fainted, collapsed and was not responding)   Negative: Shock suspected (e.g., cold/pale/clammy skin, too weak to stand, low BP, rapid pulse)   Negative: Sounds like a life-threatening emergency to the triager   Negative: Major injury to the back (e.g., MVA, fall > 10 feet or 3 meters, penetrating injury, etc.)   Negative: Pain in the upper back over the ribs (rib cage) that radiates (travels) into the chest   Negative: Pain in the upper back over the ribs (rib cage) and worsened by coughing (or clearly increases with breathing)   Negative: Back pain during pregnancy   Negative: SEVERE back pain of sudden onset and age > 60 years   Negative: SEVERE abdominal pain (e.g., excruciating)   Negative: Abdominal pain and age > 60 years   Negative: Unable to urinate (or only a few drops) and bladder feels  very full   Negative: Loss of bladder or bowel control (urine or bowel incontinence; wetting self, leaking stool) of new-onset   Negative: Numbness (loss of sensation) in groin or rectal area   Negative: Pain radiates into groin, scrotum   Negative: Blood in urine (red, pink, or tea-colored)   Negative: Vomiting and pain over lower ribs of back (i.e., flank - kidney area)   Negative: Weakness of a leg or foot (e.g., unable to bear weight, dragging foot)   Negative: Patient sounds very sick or weak to the triager   Negative: Fever > 100.4 F (38.0 C) and flank pain   Negative: Pain or burning with passing urine (urination)   Negative: SEVERE back pain (e.g., excruciating, unable to do any normal activities) and not improved after pain medicine and CARE ADVICE   Negative: Numbness in an arm or hand (i.e., loss of sensation) and upper back pain   Negative: Numbness in a leg or foot (i.e., loss of sensation)   Negative: High-risk adult (e.g., history of cancer, history of HIV, or history of IV Drug Use)   Negative: Soft tissue infection (e.g., abscess, cellulitis) or other serious infection (e.g., bacteremia) in last 2 weeks   Negative: Painful rash with multiple small blisters grouped together (i.e., dermatomal distribution or 'band' or 'stripe')   Negative: Pain radiates into the thigh or further down the leg, and in both legs   Negative: Age > 50 and no history of prior similar back pain    Protocols used: Headache-A-OH, Back Pain-A-OH

## 2023-07-31 NOTE — TELEPHONE ENCOUNTER
Patient is calling to speak with a nurse regarding her lower back pain and headache. Patient reports that she started experiencing lower back pain on the left side and a headache yesterday. Patient has tried otc pain reliever, and it hasn't really helped. Patient seeking to speak with a nurse regarding her symptoms, please advise.

## 2023-08-01 ENCOUNTER — OFFICE VISIT (OUTPATIENT)
Dept: FAMILY MEDICINE | Facility: CLINIC | Age: 23
End: 2023-08-01
Payer: COMMERCIAL

## 2023-08-01 VITALS
BODY MASS INDEX: 40.22 KG/M2 | DIASTOLIC BLOOD PRESSURE: 70 MMHG | HEIGHT: 61 IN | RESPIRATION RATE: 14 BRPM | HEART RATE: 76 BPM | OXYGEN SATURATION: 100 % | WEIGHT: 213 LBS | TEMPERATURE: 97.6 F | SYSTOLIC BLOOD PRESSURE: 128 MMHG

## 2023-08-01 DIAGNOSIS — R51.9 NONINTRACTABLE HEADACHE, UNSPECIFIED CHRONICITY PATTERN, UNSPECIFIED HEADACHE TYPE: ICD-10-CM

## 2023-08-01 DIAGNOSIS — J02.9 PHARYNGITIS, UNSPECIFIED ETIOLOGY: Primary | ICD-10-CM

## 2023-08-01 DIAGNOSIS — M54.9 BACK PAIN, UNSPECIFIED BACK LOCATION, UNSPECIFIED BACK PAIN LATERALITY, UNSPECIFIED CHRONICITY: ICD-10-CM

## 2023-08-01 LAB
ALBUMIN SERPL BCG-MCNC: 4.3 G/DL (ref 3.5–5.2)
ALBUMIN UR-MCNC: NEGATIVE MG/DL
ALP SERPL-CCNC: 71 U/L (ref 35–104)
ALT SERPL W P-5'-P-CCNC: 17 U/L (ref 0–50)
ANION GAP SERPL CALCULATED.3IONS-SCNC: 9 MMOL/L (ref 7–15)
APPEARANCE UR: CLEAR
AST SERPL W P-5'-P-CCNC: 19 U/L (ref 0–45)
BACTERIA #/AREA URNS HPF: ABNORMAL /HPF
BASOPHILS # BLD AUTO: 0 10E3/UL (ref 0–0.2)
BASOPHILS NFR BLD AUTO: 0 %
BILIRUB SERPL-MCNC: 0.2 MG/DL
BILIRUB UR QL STRIP: NEGATIVE
BUN SERPL-MCNC: 13.7 MG/DL (ref 6–20)
CALCIUM SERPL-MCNC: 9.3 MG/DL (ref 8.6–10)
CHLORIDE SERPL-SCNC: 105 MMOL/L (ref 98–107)
COLOR UR AUTO: YELLOW
CREAT SERPL-MCNC: 0.75 MG/DL (ref 0.51–0.95)
DEPRECATED HCO3 PLAS-SCNC: 24 MMOL/L (ref 22–29)
DEPRECATED S PYO AG THROAT QL EIA: NEGATIVE
EOSINOPHIL # BLD AUTO: 0.1 10E3/UL (ref 0–0.7)
EOSINOPHIL NFR BLD AUTO: 2 %
ERYTHROCYTE [DISTWIDTH] IN BLOOD BY AUTOMATED COUNT: 13.3 % (ref 10–15)
GFR SERPL CREATININE-BSD FRML MDRD: >90 ML/MIN/1.73M2
GLUCOSE SERPL-MCNC: 70 MG/DL (ref 70–99)
GLUCOSE UR STRIP-MCNC: NEGATIVE MG/DL
GROUP A STREP BY PCR: NOT DETECTED
HCG UR QL: NEGATIVE
HCT VFR BLD AUTO: 42.3 % (ref 35–47)
HGB BLD-MCNC: 13.8 G/DL (ref 11.7–15.7)
HGB UR QL STRIP: NEGATIVE
IMM GRANULOCYTES # BLD: 0 10E3/UL
IMM GRANULOCYTES NFR BLD: 0 %
KETONES UR STRIP-MCNC: NEGATIVE MG/DL
LEUKOCYTE ESTERASE UR QL STRIP: NEGATIVE
LYMPHOCYTES # BLD AUTO: 1.9 10E3/UL (ref 0.8–5.3)
LYMPHOCYTES NFR BLD AUTO: 28 %
MCH RBC QN AUTO: 26.5 PG (ref 26.5–33)
MCHC RBC AUTO-ENTMCNC: 32.6 G/DL (ref 31.5–36.5)
MCV RBC AUTO: 81 FL (ref 78–100)
MONOCYTES # BLD AUTO: 0.4 10E3/UL (ref 0–1.3)
MONOCYTES NFR BLD AUTO: 6 %
MONOCYTES NFR BLD AUTO: NEGATIVE %
NEUTROPHILS # BLD AUTO: 4.3 10E3/UL (ref 1.6–8.3)
NEUTROPHILS NFR BLD AUTO: 64 %
NITRATE UR QL: NEGATIVE
PH UR STRIP: 5.5 [PH] (ref 5–7)
PLATELET # BLD AUTO: 246 10E3/UL (ref 150–450)
POTASSIUM SERPL-SCNC: 4.1 MMOL/L (ref 3.4–5.3)
PROT SERPL-MCNC: 7.3 G/DL (ref 6.4–8.3)
RBC # BLD AUTO: 5.2 10E6/UL (ref 3.8–5.2)
RBC #/AREA URNS AUTO: ABNORMAL /HPF
SODIUM SERPL-SCNC: 138 MMOL/L (ref 136–145)
SP GR UR STRIP: 1.02 (ref 1–1.03)
SQUAMOUS #/AREA URNS AUTO: ABNORMAL /LPF
UROBILINOGEN UR STRIP-ACNC: 0.2 E.U./DL
WBC # BLD AUTO: 6.8 10E3/UL (ref 4–11)
WBC #/AREA URNS AUTO: ABNORMAL /HPF

## 2023-08-01 PROCEDURE — 81025 URINE PREGNANCY TEST: CPT | Performed by: NURSE PRACTITIONER

## 2023-08-01 PROCEDURE — 96372 THER/PROPH/DIAG INJ SC/IM: CPT | Mod: LT | Performed by: NURSE PRACTITIONER

## 2023-08-01 PROCEDURE — 86308 HETEROPHILE ANTIBODY SCREEN: CPT | Performed by: NURSE PRACTITIONER

## 2023-08-01 PROCEDURE — 81001 URINALYSIS AUTO W/SCOPE: CPT | Performed by: NURSE PRACTITIONER

## 2023-08-01 PROCEDURE — 87086 URINE CULTURE/COLONY COUNT: CPT | Performed by: NURSE PRACTITIONER

## 2023-08-01 PROCEDURE — 87651 STREP A DNA AMP PROBE: CPT | Performed by: NURSE PRACTITIONER

## 2023-08-01 PROCEDURE — 87635 SARS-COV-2 COVID-19 AMP PRB: CPT | Performed by: NURSE PRACTITIONER

## 2023-08-01 PROCEDURE — 99214 OFFICE O/P EST MOD 30 MIN: CPT | Mod: 25 | Performed by: NURSE PRACTITIONER

## 2023-08-01 PROCEDURE — 85025 COMPLETE CBC W/AUTO DIFF WBC: CPT | Performed by: NURSE PRACTITIONER

## 2023-08-01 PROCEDURE — 80053 COMPREHEN METABOLIC PANEL: CPT | Performed by: NURSE PRACTITIONER

## 2023-08-01 PROCEDURE — 36415 COLL VENOUS BLD VENIPUNCTURE: CPT | Performed by: NURSE PRACTITIONER

## 2023-08-01 RX ORDER — KETOROLAC TROMETHAMINE 30 MG/ML
30 INJECTION, SOLUTION INTRAMUSCULAR; INTRAVENOUS ONCE
Status: COMPLETED | OUTPATIENT
Start: 2023-08-01 | End: 2023-08-01

## 2023-08-01 RX ADMIN — KETOROLAC TROMETHAMINE 30 MG: 30 INJECTION, SOLUTION INTRAMUSCULAR; INTRAVENOUS at 09:25

## 2023-08-01 NOTE — PROGRESS NOTES
"  Assessment & Plan   Problem List Items Addressed This Visit    None  Visit Diagnoses       Pharyngitis, unspecified etiology    -  Primary  Patient symptoms highly suspicious of viral etiology discussed common cold virus or possibly COVID.  Also has a sore throat so strep throat should be considered.  COVID and strep testing ordered.    Exam today is reassuring-no reason for higher level of care felt at this time.   Quarantine and symptom control at home discussed. Questions answered.   Written education provided.   Note written for work.  Toradol injection for headache.  Rapid strep negative urine, hcg, mono and CBC within normal limits.  COVID and strep culture pending.  Red flag symptoms discussed and if these occur present to the emergency room or call 911.   Kelsi verbalizes understanding of plan of care and is in agreement.           Relevant Orders    Streptococcus A Rapid Screen w/Reflex to PCR - Clinic Collect (Completed)    Group A Streptococcus PCR Throat Swab    Symptomatic COVID-19 Virus (Coronavirus) by PCR Nose    Mononucleosis screen (Completed)    CBC with platelets and differential (Completed)    Comprehensive metabolic panel (BMP + Alb, Alk Phos, ALT, AST, Total. Bili, TP)    Back pain, unspecified back location, unspecified back pain laterality, unspecified chronicity        Relevant Medications    ketorolac (TORADOL) injection 30 mg (Completed)    Other Relevant Orders    UA with Microscopic - lab collect (Completed)    Urine Microscopic Exam (Completed)    Urine Culture Aerobic Bacterial - lab collect    HCG qualitative urine (Completed)    Nonintractable headache, unspecified chronicity pattern, unspecified headache type        Relevant Medications    ketorolac (TORADOL) injection 30 mg (Completed)             BMI:   Estimated body mass index is 40.25 kg/m  as calculated from the following:    Height as of this encounter: 1.549 m (5' 1\").    Weight as of this encounter: 96.6 kg (213 lb). "       Return in 1 week (on 8/8/2023) for if symptoms persist or worsening please be seen.      WARREN Treadwell CNP  [facility] REGGIE Dolan is a 22 year old, presenting for the following health issues:  Pharyngitis and Headache        8/1/2023     8:21 AM   Additional Questions   Roomed by Kanchan CARLIN   Accompanied by Self       History of Present Illness       Back Pain:  She presents for follow up of back pain. Patient's back pain is a new problem.    Original cause of back pain: not sure  First noticed back pain: in the last week  Patient feels back pain: constantlyLocation of back pain:  Left lower back  Description of back pain: sharp  Back pain spreads: nowhere    Since patient first noticed back pain, pain is: unchanged  Does back pain interfere with her job:  No  On a scale of 1-10 (10 being the worst), patient describes pain as:  5  What makes back pain worse: bending and certain positions   Acupuncture: not tried  Acetaminophen: not helpful  Activity or exercise: not helpful  Chiropractor:  Not tried  Cold: not tried  Heat: not tried  Massage: not tried  Muscle relaxants: not tried  NSAIDS: not tried  Opioids: not tried  Physical Therapy: not tried  Rest: not helpful  Steroid Injection: not tried  Stretching: not helpful  Surgery: not tried  TENS unit: not tried  Topical pain relievers: not tried  Other healthcare providers patient is seeing for back pain: None    She eats 0-1 servings of fruits and vegetables daily.She consumes 0 sweetened beverage(s) daily.She exercises with enough effort to increase her heart rate 60 or more minutes per day.  She exercises with enough effort to increase her heart rate 5 days per week.   She is taking medications regularly.       Triaged - 07/31/2023 - 4:24p  Situation   Called patient about headaches, back pain or sore throat.   Background   Does not have headaches or maigranins normally  No HX of kidney stones   No chronic back pain   "  Covid test was negative today 7/31  Assessment    Moderate headache- less severe after OTC migraine medication with caffeine.   No head injury or falls   Constant left lower back pain for 3-5 days- constant moderate and is  sharp   Pain does not radiate down legs or into groin,   Headache pain does not go down arm.   No numbness or tingling down legs feet or toes. No extremity weakness   Frequent urination for the past 5 days -no blood in urine, denies burning, pain with urination, no urgency, no odourous or cloudy urine   Mild Sore throat - 4 days - redness  is present in the back of her throat.   No trouble swallowing.   No visible swelling  no nausea  No vomiting  No vision changes   No fever, no chills  Overall not feeling as well as normal.   Recommendations   Advised UC today, patient wants to be seen in clinic tomorrow.   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  Last menstrual period a week ago.  Has tried Tylenol and Excedrin Migraine for headache yesterday with no resolve.  Denies nausea vomiting or abdominal pain.      ROS:  Constitutional, HEENT, cardiovascular, pulmonary, GI, , musculoskeletal, neuro, skin, endocrine and psych systems are negative, except as otherwise noted in the HPI.             Objective    /70 (BP Location: Right arm, Patient Position: Chair, Cuff Size: Adult Large)   Pulse 76   Temp 97.6  F (36.4  C) (Tympanic)   Resp 14   Ht 1.549 m (5' 1\")   Wt 96.6 kg (213 lb)   LMP 07/25/2023 (Approximate)   SpO2 100%   BMI 40.25 kg/m    Body mass index is 40.25 kg/m .  Physical Exam   GENERAL: healthy, alert and no distress  EYES: Eyes grossly normal to inspection, PERRL and conjunctivae and sclerae normal  HENT: ear canals and TM's normal, nose and mouth without ulcers or lesions bilateral hypertrophic tonsils.   NECK: no adenopathy, no asymmetry, masses, or scars and thyroid " normal to palpation  RESP: lungs clear to auscultation - no rales, rhonchi or wheezes  CV: regular rate and rhythm, normal S1 S2, no S3 or S4, no murmur, click or rub, no peripheral edema and peripheral pulses strong  ABDOMEN: soft, nontender, no hepatosplenomegaly, no masses and bowel sounds normal  MS: no gross musculoskeletal defects noted, no edema; NO CVA tenderness  SKIN: no suspicious lesions or rashes  NEURO: Normal strength and tone, mentation intact and speech normal  PSYCH: mentation appears normal, affect normal/bright    Results for orders placed or performed in visit on 08/01/23   UA with Microscopic - lab collect     Status: Normal   Result Value Ref Range    Color Urine Yellow Colorless, Straw, Light Yellow, Yellow    Appearance Urine Clear Clear    Glucose Urine Negative Negative mg/dL    Bilirubin Urine Negative Negative    Ketones Urine Negative Negative mg/dL    Specific Gravity Urine 1.020 1.003 - 1.035    Blood Urine Negative Negative    pH Urine 5.5 5.0 - 7.0    Protein Albumin Urine Negative Negative mg/dL    Urobilinogen Urine 0.2 0.2, 1.0 E.U./dL    Nitrite Urine Negative Negative    Leukocyte Esterase Urine Negative Negative   Urine Microscopic Exam     Status: Abnormal   Result Value Ref Range    Bacteria Urine Few (A) None Seen /HPF    RBC Urine None Seen 0-2 /HPF /HPF    WBC Urine None Seen 0-5 /HPF /HPF    Squamous Epithelials Urine Few (A) None Seen /LPF   HCG qualitative urine     Status: Normal   Result Value Ref Range    hCG Urine Qualitative Negative Negative   Mononucleosis screen     Status: Normal   Result Value Ref Range    Mononucleosis Screen Negative Negative   CBC with platelets and differential     Status: None   Result Value Ref Range    WBC Count 6.8 4.0 - 11.0 10e3/uL    RBC Count 5.20 3.80 - 5.20 10e6/uL    Hemoglobin 13.8 11.7 - 15.7 g/dL    Hematocrit 42.3 35.0 - 47.0 %    MCV 81 78 - 100 fL    MCH 26.5 26.5 - 33.0 pg    MCHC 32.6 31.5 - 36.5 g/dL    RDW 13.3 10.0 -  15.0 %    Platelet Count 246 150 - 450 10e3/uL    % Neutrophils 64 %    % Lymphocytes 28 %    % Monocytes 6 %    % Eosinophils 2 %    % Basophils 0 %    % Immature Granulocytes 0 %    Absolute Neutrophils 4.3 1.6 - 8.3 10e3/uL    Absolute Lymphocytes 1.9 0.8 - 5.3 10e3/uL    Absolute Monocytes 0.4 0.0 - 1.3 10e3/uL    Absolute Eosinophils 0.1 0.0 - 0.7 10e3/uL    Absolute Basophils 0.0 0.0 - 0.2 10e3/uL    Absolute Immature Granulocytes 0.0 <=0.4 10e3/uL   Streptococcus A Rapid Screen w/Reflex to PCR - Clinic Collect     Status: Normal    Specimen: Throat; Swab   Result Value Ref Range    Group A Strep antigen Negative Negative   CBC with platelets and differential     Status: None    Narrative    The following orders were created for panel order CBC with platelets and differential.  Procedure                               Abnormality         Status                     ---------                               -----------         ------                     CBC with platelets and d...[[id number]]                      Final result                 Please view results for these tests on the individual orders.

## 2023-08-01 NOTE — LETTER
August 1, 2023      Kelsi Bennett  [address] [facility] [id number]        To Whom It May Concern:    Kelsi Bennett  was seen on 8/1/23.  Please excuse her from work due to illness. She can return to work Thursday 8/3/23.         Sincerely,         WARREN Treadwell CNP

## 2023-08-02 LAB — SARS-COV-2 RNA RESP QL NAA+PROBE: NEGATIVE

## 2023-08-03 LAB — BACTERIA UR CULT: NO GROWTH

## 2023-08-03 NOTE — RESULT ENCOUNTER NOTE
Dear Kelsi,    Here is a summary of your recent test results:    -All of your labs are normal. I hope you are feeling better.     For additional lab test information, labtestsonline.org is an excellent reference.    In addition, here is a list of due or overdue Health Maintenance reminders:    Hepatitis B Vaccine(2 of 3 - 3-dose series) due on 09/23/2003  COVID-19 Vaccine(4 - Moderna series) due on 12/25/2021    Please call us at [phone] (or use [facility]) to address the above recommendations if needed.    Thank you for choosing [facility].  It was an honor and a privilege to participate in your care.       Healthy regards,    Lianna Colin, PHILLY  [facility]

## 2023-10-05 ENCOUNTER — OFFICE VISIT (OUTPATIENT)
Dept: FAMILY MEDICINE | Facility: CLINIC | Age: 23
End: 2023-10-05
Payer: COMMERCIAL

## 2023-10-05 VITALS
RESPIRATION RATE: 16 BRPM | TEMPERATURE: 96 F | DIASTOLIC BLOOD PRESSURE: 68 MMHG | SYSTOLIC BLOOD PRESSURE: 114 MMHG | HEART RATE: 104 BPM | OXYGEN SATURATION: 98 % | WEIGHT: 220.8 LBS | HEIGHT: 61 IN | BODY MASS INDEX: 41.69 KG/M2

## 2023-10-05 DIAGNOSIS — H00.024 HORDEOLUM INTERNUM LEFT UPPER EYELID: Primary | ICD-10-CM

## 2023-10-05 PROCEDURE — 99213 OFFICE O/P EST LOW 20 MIN: CPT | Performed by: PHYSICIAN ASSISTANT

## 2023-10-05 RX ORDER — ERYTHROMYCIN 5 MG/G
0.5 OINTMENT OPHTHALMIC 3 TIMES DAILY
Qty: 1 G | Refills: 0 | Status: SHIPPED | OUTPATIENT
Start: 2023-10-05 | End: 2024-02-14

## 2023-10-05 RX ORDER — POLYMYXIN B SULFATE AND TRIMETHOPRIM 1; 10000 MG/ML; [USP'U]/ML
1-2 SOLUTION OPHTHALMIC EVERY 4 HOURS
Qty: 10 ML | Refills: 0 | Status: SHIPPED | OUTPATIENT
Start: 2023-10-05 | End: 2024-03-21

## 2023-10-05 ASSESSMENT — ENCOUNTER SYMPTOMS: EYE PAIN: 1

## 2023-10-05 NOTE — PROGRESS NOTES
"  Assessment & Plan     Hordeolum internum left upper eyelid  Clinical picture consistent with hordeolum internum and early blepharitis.  Trimethoprim-polymyxcin b eye drops to be instilled every 4 hours PRN and Erythromycin ointment to be applied TID until symptoms improve. Warm compresses to be applied throughout the day for symptomatic relief. Instructed to use clean washcloths for compresses and good hand hygiene prior to instilling medications.   - erythromycin (ROMYCIN) 5 MG/GM ophthalmic ointment  Dispense: 1 g; Refill: 0  - trimethoprim-polymyxin b (POLYTRIM) 30025-3.1 UNIT/ML-% ophthalmic solution  Dispense: 10 mL; Refill: 0       BMI:   Estimated body mass index is 41.72 kg/m  as calculated from the following:    Height as of this encounter: 1.549 m (5' 1\").    Weight as of this encounter: 100.2 kg (220 lb 12.8 oz).   Weight management plan: Discussed healthy diet and exercise guidelines    Return in 5 days (on 10/10/2023) for Evaluation of worsening or not improving.      Magdalena Carson PA-C  [facility] REGGIE Dolan is a 22 year old, presenting for the following health issues:  Eye Problem        10/5/2023     4:39 PM   Additional Questions   Roomed by Gabi TESFAYE   Accompanied by Self     Left Eye  History of Present Illness       Reason for visit:  Eyelid swollen, in pain, and crusty  Symptom onset:  1-3 days ago  Symptoms include:  Eyelid swollen, in pain, and crusty  Symptom intensity:  Moderate  Symptom progression:  Worsening  Had these symptoms before:  No  What makes it worse:  Touching it  What makes it better:  Hot packs    She eats 2-3 servings of fruits and vegetables daily.She consumes 0 sweetened beverage(s) daily.She exercises with enough effort to increase her heart rate 30 to 60 minutes per day.  She exercises with enough effort to increase her heart rate 6 days per week.   She is taking medications regularly.     Presents today with left upper eyelid " "pain, inflammation and crusted over this morning upon waking. Symptoms started yesterday morning, but did not have crusting of the eye until this morning. Symptoms are in the left eye only. Has been using artificial tears and warm water with some symptomatic relief noted. Pain is notably worse with blinking but denies any pain with eye movement. Denies wearing contacts.     Review of Systems   Eyes:  Positive for pain.      Constitutional, HEENT, cardiovascular, pulmonary, gi and gu systems are negative, except as otherwise noted.      Objective    /68   Pulse 104   Temp (!) 96  F (35.6  C) (Tympanic)   Resp 16   Ht 1.549 m (5' 1\")   Wt 100.2 kg (220 lb 12.8 oz)   LMP  (LMP Unknown)   SpO2 98%   BMI 41.72 kg/m    Body mass index is 41.72 kg/m .  Physical Exam   GENERAL: alert and no distress  EYES: Eyes grossly normal to inspection, conjunctivae and sclerae normal, left upper eyelid boggy with slight erythema and edema, tenderness with palpation, eversion reveals pustular lesion on the inner upper lid without active drainage  HENT: nose and mouth without ulcers or lesions  MS: no gross musculoskeletal defects noted  SKIN: no suspicious lesions or rashes  NEURO: Normal strength and tone, mentation intact and speech normal  PSYCH: mentation appears normal                      "

## 2023-10-18 ENCOUNTER — OFFICE VISIT (OUTPATIENT)
Dept: MIDWIFE SERVICES | Facility: CLINIC | Age: 23
End: 2023-10-18
Payer: COMMERCIAL

## 2023-10-18 VITALS — SYSTOLIC BLOOD PRESSURE: 118 MMHG | DIASTOLIC BLOOD PRESSURE: 70 MMHG | WEIGHT: 224.3 LBS | BODY MASS INDEX: 42.38 KG/M2

## 2023-10-18 DIAGNOSIS — Z30.011 ENCOUNTER FOR INITIAL PRESCRIPTION OF CONTRACEPTIVE PILLS: ICD-10-CM

## 2023-10-18 DIAGNOSIS — Z30.41 ENCOUNTER FOR SURVEILLANCE OF CONTRACEPTIVE PILLS: ICD-10-CM

## 2023-10-18 LAB
HCG UR QL: NEGATIVE
INTERNAL QC OK POCT: NORMAL
POCT KIT EXPIRATION DATE: NORMAL
POCT KIT LOT NUMBER: NORMAL

## 2023-10-18 PROCEDURE — 81025 URINE PREGNANCY TEST: CPT | Performed by: ADVANCED PRACTICE MIDWIFE

## 2023-10-18 PROCEDURE — 99213 OFFICE O/P EST LOW 20 MIN: CPT | Performed by: ADVANCED PRACTICE MIDWIFE

## 2023-10-18 RX ORDER — NORETHINDRONE ACETATE AND ETHINYL ESTRADIOL 1MG-20(21)
1 KIT ORAL DAILY
Qty: 84 TABLET | Refills: 3 | Status: SHIPPED | OUTPATIENT
Start: 2023-10-18 | End: 2024-05-02

## 2023-10-18 RX ORDER — NORETHINDRONE ACETATE AND ETHINYL ESTRADIOL 1MG-20(21)
1 KIT ORAL DAILY
Qty: 84 TABLET | Refills: 0 | Status: SHIPPED | OUTPATIENT
Start: 2023-10-18 | End: 2023-10-18

## 2023-10-18 NOTE — TELEPHONE ENCOUNTER
Prescription approved per [facility] Refill Protocol.     Mychart msg sent indicating pt needs an annual appt in November to continue OCP refills or can get it filled by PCP.     ROSSY Rey

## 2023-10-18 NOTE — NURSING NOTE
"Chief Complaint   Patient presents with    Recheck Medication     On contraception, patient is taking Junel and has no issues or complaints. Takes it regularly        Initial /70   Wt 101.7 kg (224 lb 4.8 oz)   LMP  (LMP Unknown)   BMI 42.38 kg/m   Estimated body mass index is 42.38 kg/m  as calculated from the following:    Height as of 10/5/23: 1.549 m (5' 1\").    Weight as of this encounter: 101.7 kg (224 lb 4.8 oz).  BP completed using cuff size: large    Questioned patient about current smoking habits.  Pt. has never smoked.          The following HM Due: NONE    Keith Plummer CMA             "

## 2023-10-18 NOTE — TELEPHONE ENCOUNTER
Junel  Last Written Prescription Date:  11/2022  Last Fill Quantity: 1/20 mg-mcg,   # refills: 3  Last Office Visit: Carolyn Rush 11/2022, Loretta Valenzuela 4/2023  Future Office visit: N/A    Is she continuinf care with Loretta or does she need to come back to Carolyn for refill?  Ginny Solitario, CMA

## 2023-10-18 NOTE — PROGRESS NOTES
SUBJECTIVE:   Kelsi Bennett is a 23 year old who presents to the clinic for refill of ANDRE.   She has been on Junel for a year and has no issues. She desires to continue this form of birth control.     Histories reviewed and updated  History reviewed. No pertinent past medical history.  Past Surgical History:   Procedure Laterality Date    NO HISTORY OF SURGERY       Social History     Socioeconomic History    Marital status: Single     Spouse name: Not on file    Number of children: 0    Years of education: Not on file    Highest education level: Not on file   Occupational History    Occupation:      Comment: [employer]   Tobacco Use    Smoking status: Never    Smokeless tobacco: Never   Vaping Use    Vaping Use: Never used   Substance and Sexual Activity    Alcohol use: Not Currently     Alcohol/week: 1.0 standard drink of alcohol     Types: 1 Standard drinks or equivalent per week     Comment: seldom - 1-2 montly    Drug use: Never    Sexual activity: Yes     Partners: Male     Birth control/protection: Pill   Other Topics Concern    Parent/sibling w/ CABG, MI or angioplasty before 65F 55M? No   Social History Narrative    Not on file     Social Determinants of Health     Financial Resource Strain: Low Risk  (10/5/2023)    Financial Resource Strain     Within the past 12 months, have you or your family members you live with been unable to get utilities (heat, electricity) when it was really needed?: No   Food Insecurity: Low Risk  (10/5/2023)    Food Insecurity     Within the past 12 months, did you worry that your food would run out before you got money to buy more?: No     Within the past 12 months, did the food you bought just not last and you didn t have money to get more?: No   Transportation Needs: Low Risk  (10/5/2023)    Transportation Needs     Within the past 12 months, has lack of transportation kept you from medical appointments, getting your medicines,  non-medical meetings or appointments, work, or from getting things that you need?: No   Physical Activity: Not on file   Stress: Not on file   Social Connections: Not on file   Interpersonal Safety: Low Risk  (10/5/2023)    Interpersonal Safety     Do you feel physically and emotionally safe where you currently live?: Yes     Within the past 12 months, have you been hit, slapped, kicked or otherwise physically hurt by someone?: No     Within the past 12 months, have you been humiliated or emotionally abused in other ways by your partner or ex-partner?: No   Housing Stability: Low Risk  (10/5/2023)    Housing Stability     Do you have housing? : Yes     Are you worried about losing your housing?: No     Family History   Adopted: Yes   Problem Relation Age of Onset    No Known Problems Father     No Known Problems Mother     No Known Problems Maternal Grandmother     No Known Problems Maternal Grandfather     No Known Problems Paternal Grandmother     No Known Problems Other        Menstrual History:  Menses every 28 days.  Menstrual description: normal    Denies the following contraindications to estrogen/progesterone combined contraception:  Migraine with aura  Smoking over age 35  Liver disease  Personal history of blood clot or stroke   History of heart disease  History of breast cancer  Undiagnosed vaginal bleeding  Hypertension  Pregnancy    Health maintenance updated:  yes  ROS:    ROS: 10 point ROS neg other than the symptoms noted above in the HPI.    EXAM:  /70   Wt 101.7 kg (224 lb 4.8 oz)   LMP  (LMP Unknown)   BMI 42.38 kg/m    Body mass index is 42.38 kg/m .  Exam:  Constitutional: healthy, alert and no distress  Respiratory: negative, Percussion normal. Good diaphragmatic excursion.   Psychiatric: mentation appears normal and affect normal/bright  No further exam needed as this is a counseling appointment.    ASSESSMENT/PLAN:    ICD-10-CM    1. Encounter for surveillance of contraceptive pills   Z30.41 norethindrone-ethinyl estradiol (JUNEL FE 1/20) 1-20 MG-MCG tablet        There are no contraindications to the use of ANDRE  Refills sent x1 yr. Due for a pap. Counseled to schedule annual exam to have this done.     COUNSELING:  Reviewed risks and benefits of contraceptive use  The use of the oral contraceptive pill has been fully discussed with the patient. This includes the proper method to initiate  and continue the pill, the need for regular compliance to ensure adequate contraceptive effect, the physiology which makes the pill effective, the instructions for what to do in event of a missed pill, and warnings about anticipated minor side effects such as breakthrough spotting, nausea, breast tenderness, weight changes, acne, headaches, etc. She was informed of the irregular bleeding pattern that can occur when the pill is first started or a new form is changed over for the first 2-3 months.  She has been told of the more serious potential side effects such as MI, stroke, and deep vein thrombosis, all of which are very unlikely.  She has been asked to report any signs of such serious problems immediately.   She understands and wishes to take the medication as prescribed.   Discussed proper use of chosen method  Handouts/Instructions provided          WARREN Messer, JACQUELYN

## 2024-01-29 ENCOUNTER — OFFICE VISIT (OUTPATIENT)
Dept: FAMILY MEDICINE | Facility: CLINIC | Age: 24
End: 2024-01-29
Payer: COMMERCIAL

## 2024-01-29 VITALS
HEIGHT: 61 IN | DIASTOLIC BLOOD PRESSURE: 74 MMHG | RESPIRATION RATE: 14 BRPM | OXYGEN SATURATION: 98 % | HEART RATE: 65 BPM | WEIGHT: 224 LBS | TEMPERATURE: 98.2 F | BODY MASS INDEX: 42.29 KG/M2 | SYSTOLIC BLOOD PRESSURE: 118 MMHG

## 2024-01-29 DIAGNOSIS — J02.9 SORE THROAT: Primary | ICD-10-CM

## 2024-01-29 DIAGNOSIS — H10.31 ACUTE CONJUNCTIVITIS OF RIGHT EYE, UNSPECIFIED ACUTE CONJUNCTIVITIS TYPE: ICD-10-CM

## 2024-01-29 LAB — DEPRECATED S PYO AG THROAT QL EIA: NEGATIVE

## 2024-01-29 PROCEDURE — 87651 STREP A DNA AMP PROBE: CPT | Performed by: FAMILY MEDICINE

## 2024-01-29 PROCEDURE — 87635 SARS-COV-2 COVID-19 AMP PRB: CPT | Performed by: FAMILY MEDICINE

## 2024-01-29 PROCEDURE — 99213 OFFICE O/P EST LOW 20 MIN: CPT | Performed by: FAMILY MEDICINE

## 2024-01-29 RX ORDER — POLYMYXIN B SULFATE AND TRIMETHOPRIM 1; 10000 MG/ML; [USP'U]/ML
1-2 SOLUTION OPHTHALMIC EVERY 4 HOURS
Qty: 10 ML | Refills: 0 | Status: SHIPPED | OUTPATIENT
Start: 2024-01-29 | End: 2024-02-08

## 2024-01-29 ASSESSMENT — PAIN SCALES - GENERAL: PAINLEVEL: NO PAIN (0)

## 2024-01-29 NOTE — PROGRESS NOTES
"  Assessment & Plan     Sore throat  Rapid strep negative and PCR pending. COVID-19 test also pending. Likely viral process. Discussed symptomatic cares and expected duration of symptoms. Follow up if not improving.  - Streptococcus A Rapid Screen w/Reflex to PCR - Clinic Collect  - Symptomatic COVID-19 Virus (Coronavirus) by PCR Nose  - Group A Streptococcus PCR Throat Swab    Acute conjunctivitis of right eye, unspecified acute conjunctivitis type  Increasing purulent appearing discharge in right eye. Cover for bacterial infection with Polytrim.  - polymixin b-trimethoprim (POLYTRIM) 32465-1.1 UNIT/ML-% ophthalmic solution; Place 1-2 drops into the right eye every 4 hours for 10 days        Subjective   Kelsi is a 23 year old, presenting for the following health issues:    Throat Problem    History of Present Illness       Reason for visit:  Sore throat and swollen neck, minimal cough, no fever, congestion  Symptom onset:  1-3 days ago  Symptoms include:  Sore throat and swollen neck, minimal cough, no fever, congestion  Symptom intensity:  Moderate  Symptom progression:  Worsening  Had these symptoms before:  No    She eats 2-3 servings of fruits and vegetables daily.She consumes 0 sweetened beverage(s) daily.She exercises with enough effort to increase her heart rate 30 to 60 minutes per day.  She exercises with enough effort to increase her heart rate 3 or less days per week.   She is taking medications regularly.     She works as a . Strep, upper respiratory infection, GI illnesses are going around the school.        Objective    /74   Pulse 65   Temp 98.2  F (36.8  C) (Tympanic)   Resp 14   Ht 1.549 m (5' 1\")   Wt 101.6 kg (224 lb)   LMP  (Approximate)   SpO2 98%   BMI 42.32 kg/m    Body mass index is 42.32 kg/m .  Physical Exam   GENERAL: alert and no distress  HENT: ear canals and TM's normal, nose and mouth without ulcers or lesions  NECK: no adenopathy and no asymmetry, " masses, or scars  RESP: lungs clear to auscultation - no rales, rhonchi or wheezes  CV: regular rates and rhythm, normal S1 S2, no S3 or S4, and no murmur, click or rub  PSYCH: mentation appears normal, affect normal/bright            Signed Electronically by: John Paul Huang DO

## 2024-01-30 LAB — GROUP A STREP BY PCR: NOT DETECTED

## 2024-01-31 LAB — SARS-COV-2 RNA RESP QL NAA+PROBE: NEGATIVE

## 2024-02-08 ENCOUNTER — MYC MEDICAL ADVICE (OUTPATIENT)
Dept: FAMILY MEDICINE | Facility: CLINIC | Age: 24
End: 2024-02-08

## 2024-02-08 DIAGNOSIS — H10.31 ACUTE CONJUNCTIVITIS OF RIGHT EYE, UNSPECIFIED ACUTE CONJUNCTIVITIS TYPE: Primary | ICD-10-CM

## 2024-02-12 NOTE — TELEPHONE ENCOUNTER
LOV 1/29/2024        Please see my chart message below     Please review and advise     Thank you     Becky Nielson RN, BSN  [facility]

## 2024-02-12 NOTE — TELEPHONE ENCOUNTER
Recommend warm/cool compresses (whichever feels better). Any itching to they eye? Is the discharge more watery or purulent? She could continue the antibiotic eye drop for another week but if still not improving, may need to be seen. It is possible that it could be viral, allergic or another cause all together. She could try an over the counter antihistamine eye drop like ketotifen or just a moisturizing drop.     John Paul Huang,   2/12/2024 3:44 PM

## 2024-02-12 NOTE — TELEPHONE ENCOUNTER
My chart message sent     Awaiting reply     Becky Nielson RN, BSN  [facility]

## 2024-02-14 RX ORDER — OFLOXACIN 3 MG/ML
SOLUTION/ DROPS OPHTHALMIC
Qty: 10 ML | Refills: 0 | Status: SHIPPED | OUTPATIENT
Start: 2024-02-14 | End: 2024-03-21

## 2024-02-14 NOTE — TELEPHONE ENCOUNTER
Please see my chart message below     Please review and advise     Thank you     Becky Nielson RN, BSN  [facility]

## 2024-02-14 NOTE — TELEPHONE ENCOUNTER
Let's change antibiotic drop. New Rx sent to pharmacy. If not improving, follow up in clinic.    John Paul Huang,   2/14/2024 8:47 AM

## 2024-03-20 ENCOUNTER — NURSE TRIAGE (OUTPATIENT)
Dept: FAMILY MEDICINE | Facility: CLINIC | Age: 24
End: 2024-03-20
Payer: COMMERCIAL

## 2024-03-20 NOTE — TELEPHONE ENCOUNTER
Needs in person assessment and prefer today.  Please have seen at  - likely will end up at [facility]      Magdalena Carson MBA, MS, PA-C  [facility] [place]

## 2024-03-20 NOTE — TELEPHONE ENCOUNTER
Luciana Feliciano provider at [facility] recommends Pt be seen in clinic first for assessment. Please advise if ok to wait until tomorrow, only virtual available today. Could send to  or look at other clinics.     Kemal Lutz RN [facility]

## 2024-03-20 NOTE — TELEPHONE ENCOUNTER
S-(situation): headache    B-(background): was at the gym, was lifting weights.  Developed a sudden onset headache that was very severe(7/10).  No history of headaches.       A-(assessment): stated was at the gym lifting on 3/16 and suddenly developed a headache.  Initially the pain is a 7/10, then pain started to decrease.  First headache lasted most of the day and gradually subsided.  Patient went to gym again the next day and noted same headache developed suddenly again once started lifting weights.  Noting throbbing pain in the back of her neck and has pressure all over head.  Throbbing/pressure feeling encompasses her whole head.  When first gets the headache, feels like in a fog, but that sensation doesn't last long.  Denied any visual changes, nausea, dizziness, eye pain, fever, stiff neck, sore throat, cold symptoms, recent injury.    R-(recommendations): per protocol, should be seen today or tomorrow.  Has appointment with Lianna BEASLEY for tomorrow, but patient wondering if the SHARRI spot could be used tomorrow (3/21)for patient.  Please advise, thanks.      Reason for Disposition   Unexplained headache that is present > 24 hours    Additional Information   Negative: Difficult to awaken or acting confused (e.g., disoriented, slurred speech)   Negative: Weakness of the face, arm or leg on one side of the body and new-onset   Negative: Numbness of the face, arm or leg on one side of the body and new-onset   Negative: Loss of speech or garbled speech and new-onset   Negative: Passed out (i.e., fainted, collapsed and was not responding)   Negative: Sounds like a life-threatening emergency to the triager   Negative: Followed a head injury within last 3 days   Negative: Traumatic Brain Injury (TBI) is suspected   Negative: Sinus pain of forehead and yellow or green nasal discharge   Negative: Pregnant   Negative: Unable to walk without falling   Negative: Stiff neck (can't touch chin to chest)   Negative: Possibility of  "carbon monoxide exposure   Negative: SEVERE headache, states 'worst headache' of life   Negative: SEVERE headache, sudden-onset (i.e., reaching maximum intensity within seconds to 1 hour)   Negative: Severe pain in one eye   Negative: Loss of vision or double vision  (Exception: Same as prior migraines.)   Negative: Patient sounds very sick or weak to the triager   Negative: Fever > 103 F (39.4 C)   Negative: Fever > 100.0 F (37.8 C) and has diabetes mellitus or a weak immune system (e.g., HIV positive, cancer chemotherapy, organ transplant, splenectomy, chronic steroids)   Negative: SEVERE headache (e.g., excruciating) and has had severe headaches before   Negative: SEVERE headache and not relieved by pain meds   Negative: SEVERE headache and vomiting   Negative: SEVERE headache and fever   Negative: New headache and weak immune system (e.g., HIV positive, cancer chemo, splenectomy, organ transplant, chronic steroids)   Negative: Fever present > 3 days (72 hours)   Negative: Patient wants to be seen    Answer Assessment - Initial Assessment Questions  1. LOCATION: \"Where does it hurt?\"       Back of head and wraps around to whole head  2. ONSET: \"When did the headache start?\" (Minutes, hours or days)       Started at the gym on 3/16(Saturday)  3. PATTERN: \"Does the pain come and go, or has it been constant since it started?\"      intermittent  4. SEVERITY: \"How bad is the pain?\" and \"What does it keep you from doing?\"  (e.g., Scale 1-10; mild, moderate, or severe)    - MILD (1-3): doesn't interfere with normal activities     - MODERATE (4-7): interferes with normal activities or awakens from sleep     - SEVERE (8-10): excruciating pain, unable to do any normal activities         7/10 with onset and then pain decreases.    5. RECURRENT SYMPTOM: \"Have you ever had headaches before?\" If Yes, ask: \"When was the last time?\" and \"What happened that time?\"       no  6. CAUSE: \"What do you think is causing the headache?\"   " "   unsure  7. MIGRAINE: \"Have you been diagnosed with migraine headaches?\" If Yes, ask: \"Is this headache similar?\"       no  8. HEAD INJURY: \"Has there been any recent injury to the head?\"       no  9. OTHER SYMPTOMS: \"Do you have any other symptoms?\" (fever, stiff neck, eye pain, sore throat, cold symptoms)      no  10. PREGNANCY: \"Is there any chance you are pregnant?\" \"When was your last menstrual period?\"        No. LMP was 3/4/24-3/9/24    Protocols used: Headache-A-OH    "

## 2024-03-21 ENCOUNTER — OFFICE VISIT (OUTPATIENT)
Dept: PEDIATRICS | Facility: CLINIC | Age: 24
End: 2024-03-21
Payer: COMMERCIAL

## 2024-03-21 ENCOUNTER — HOSPITAL ENCOUNTER (OUTPATIENT)
Dept: MRI IMAGING | Facility: CLINIC | Age: 24
Discharge: HOME | End: 2024-03-21
Attending: INTERNAL MEDICINE
Payer: COMMERCIAL

## 2024-03-21 ENCOUNTER — HOSPITAL ENCOUNTER (OUTPATIENT)
Dept: MRI IMAGING | Facility: CLINIC | Age: 24
Discharge: HOME | End: 2024-03-21
Attending: INTERNAL MEDICINE | Admitting: INTERNAL MEDICINE
Payer: COMMERCIAL

## 2024-03-21 ENCOUNTER — OFFICE VISIT (OUTPATIENT)
Dept: FAMILY MEDICINE | Facility: CLINIC | Age: 24
End: 2024-03-21
Payer: COMMERCIAL

## 2024-03-21 VITALS
OXYGEN SATURATION: 99 % | TEMPERATURE: 97.1 F | BODY MASS INDEX: 42.1 KG/M2 | RESPIRATION RATE: 18 BRPM | DIASTOLIC BLOOD PRESSURE: 68 MMHG | HEART RATE: 76 BPM | SYSTOLIC BLOOD PRESSURE: 102 MMHG | WEIGHT: 223 LBS | HEIGHT: 61 IN

## 2024-03-21 VITALS
OXYGEN SATURATION: 98 % | TEMPERATURE: 97.9 F | WEIGHT: 223 LBS | DIASTOLIC BLOOD PRESSURE: 76 MMHG | SYSTOLIC BLOOD PRESSURE: 116 MMHG | HEART RATE: 76 BPM | RESPIRATION RATE: 18 BRPM | BODY MASS INDEX: 42.14 KG/M2

## 2024-03-21 DIAGNOSIS — G44.84 EXERTIONAL HEADACHE: ICD-10-CM

## 2024-03-21 DIAGNOSIS — G44.84 EXERTIONAL HEADACHE, PRIMARY: Primary | ICD-10-CM

## 2024-03-21 DIAGNOSIS — G44.84 EXERTIONAL HEADACHE: Primary | ICD-10-CM

## 2024-03-21 PROCEDURE — 70553 MRI BRAIN STEM W/O & W/DYE: CPT

## 2024-03-21 PROCEDURE — 70544 MR ANGIOGRAPHY HEAD W/O DYE: CPT | Mod: XU

## 2024-03-21 PROCEDURE — 99207 PR FIRST ORDER ACUTE REFERRAL: CPT | Performed by: NURSE PRACTITIONER

## 2024-03-21 PROCEDURE — 70549 MR ANGIOGRAPH NECK W/O&W/DYE: CPT

## 2024-03-21 PROCEDURE — A9585 GADOBUTROL INJECTION: HCPCS | Performed by: INTERNAL MEDICINE

## 2024-03-21 PROCEDURE — 255N000002 HC RX 255 OP 636: Performed by: INTERNAL MEDICINE

## 2024-03-21 PROCEDURE — 99213 OFFICE O/P EST LOW 20 MIN: CPT | Performed by: INTERNAL MEDICINE

## 2024-03-21 PROCEDURE — 70544 MR ANGIOGRAPHY HEAD W/O DYE: CPT

## 2024-03-21 RX ORDER — GADOBUTROL 604.72 MG/ML
10 INJECTION INTRAVENOUS ONCE
Status: COMPLETED | OUTPATIENT
Start: 2024-03-21 | End: 2024-03-21

## 2024-03-21 RX ADMIN — GADOBUTROL 10 ML: 604.72 INJECTION INTRAVENOUS at 12:56

## 2024-03-21 ASSESSMENT — ENCOUNTER SYMPTOMS: HEADACHES: 1

## 2024-03-21 NOTE — PROGRESS NOTES
"  Assessment & Plan     Exertional headache, primary  Concern due to acute onset and repeated presentation. Will try to get in to [facility] today to facilitate imaging. Told patient today no exercise or lifting. If any worsening severity or sharp stabbing occurs or new neurologic symptoms needs to present to ED.              Farida Dolan is a 23 year old, presenting for the following health issues:  Headache        3/21/2024     7:43 AM   Additional Questions   Roomed by Magdalena ROSS     History of Present Illness       Headaches:   Since the patient's last clinic visit, headaches are: worsened  The patient is getting headaches:  Whenever I work out. Started on Saturday.  She is able to do normal daily activities when she has a migraine.  The patient is taking the following rescue/relief medications:  Ibuprofen (Advil, Motrin)   Patient states \"The relief is inconsistent\" from the rescue/relief medications.   The patient is taking the following medications to prevent migraines:  No medications to prevent migraines  In the past 4 weeks, the patient has gone to an Urgent Care or Emergency Room 0 times times due to headaches.    She eats 2-3 servings of fruits and vegetables daily.She consumes 0 sweetened beverage(s) daily.She exercises with enough effort to increase her heart rate 30 to 60 minutes per day.  She exercises with enough effort to increase her heart rate 3 or less days per week.   She is taking medications regularly.     Patient presents with new exertional headaches starting this past Saturday at the gym(5 days ago). She has been going to the gym for a couple years and lifts heavy weights. Headache started while lifting on saturday and was sharp and severe. Upon stopping it improved to dull headache. Eventually subsided but still has some dull headache going on currently. At time of severe headache had some blurred vision. Has tried going to the gym a couple times since and will get headache with exertion " "again. No other illness symptoms. No headache history.    BP in clinic is normal. Drinks about 200 mg caffeine daily typically. Uses pre-workout at gym but no changes to that for some time. She is adopted and so we do not know medical history.          Constitutional, HEENT, cardiovascular, pulmonary, GI, , musculoskeletal, neuro, skin, endocrine and psych systems are negative, except as otherwise noted.        Objective    /68   Pulse 76   Temp 97.1  F (36.2  C)   Resp 18   Ht 1.549 m (5' 1\")   Wt 101.2 kg (223 lb)   LMP  (Approximate)   SpO2 99%   BMI 42.14 kg/m    Body mass index is 42.14 kg/m .  Physical Exam   GENERAL: alert and no distress  NECK: no adenopathy, no asymmetry, masses, or scars  RESP: lungs clear to auscultation - no rales, rhonchi or wheezes  CV: regular rate and rhythm, normal S1 S2, no S3 or S4, no murmur, click or rub, no peripheral edema  ABDOMEN: soft, nontender, no hepatosplenomegaly, no masses and bowel sounds normal  MS: no gross musculoskeletal defects noted, no edema  NEURO: Normal strength and tone, mentation intact and speech normal            Signed Electronically by: Lianna Payne CNP    "

## 2024-03-21 NOTE — PATIENT INSTRUCTIONS
(G44.84) Exertional headache  (primary encounter diagnosis)  Comment: MRI reviewed showing no concerning findings.  After ruling out more concerning causes, it appears you have had an exertional headache.  The pathophysiology of exercise headache is not well understood. As with cough headache, most theories center on the transmission of increases in intra-abdominal/intrathoracic pressure into the head. Recommend as needed use of acetaminophen prior to exercise.  If not improved.  Recommend follow up with primary care physician and consider neurology consult  Plan: MR Brain w/o & w Contrast, MRA Neck (Carotids)         wo & w Contrast, sodium chloride (PF) 0.9% PF         flush 3 mL, CANCELED: MRA Brain ([place]) wo & w Contrast

## 2024-05-01 SDOH — HEALTH STABILITY: PHYSICAL HEALTH: ON AVERAGE, HOW MANY MINUTES DO YOU ENGAGE IN EXERCISE AT THIS LEVEL?: 60 MIN

## 2024-05-01 SDOH — HEALTH STABILITY: PHYSICAL HEALTH: ON AVERAGE, HOW MANY DAYS PER WEEK DO YOU ENGAGE IN MODERATE TO STRENUOUS EXERCISE (LIKE A BRISK WALK)?: 3 DAYS

## 2024-05-01 ASSESSMENT — SOCIAL DETERMINANTS OF HEALTH (SDOH): HOW OFTEN DO YOU GET TOGETHER WITH FRIENDS OR RELATIVES?: ONCE A WEEK

## 2024-05-02 ENCOUNTER — OFFICE VISIT (OUTPATIENT)
Dept: FAMILY MEDICINE | Facility: CLINIC | Age: 24
End: 2024-05-02
Payer: COMMERCIAL

## 2024-05-02 VITALS
RESPIRATION RATE: 16 BRPM | HEART RATE: 91 BPM | TEMPERATURE: 96.6 F | HEIGHT: 61 IN | BODY MASS INDEX: 43.05 KG/M2 | DIASTOLIC BLOOD PRESSURE: 76 MMHG | SYSTOLIC BLOOD PRESSURE: 114 MMHG | WEIGHT: 228 LBS | OXYGEN SATURATION: 100 %

## 2024-05-02 DIAGNOSIS — Z00.00 ROUTINE GENERAL MEDICAL EXAMINATION AT A HEALTH CARE FACILITY: Primary | ICD-10-CM

## 2024-05-02 DIAGNOSIS — Z30.41 ENCOUNTER FOR SURVEILLANCE OF CONTRACEPTIVE PILLS: ICD-10-CM

## 2024-05-02 DIAGNOSIS — E66.01 MORBID OBESITY (H): ICD-10-CM

## 2024-05-02 DIAGNOSIS — Z11.3 SCREENING FOR STDS (SEXUALLY TRANSMITTED DISEASES): ICD-10-CM

## 2024-05-02 DIAGNOSIS — F41.8 SITUATIONAL ANXIETY: ICD-10-CM

## 2024-05-02 PROCEDURE — 90715 TDAP VACCINE 7 YRS/> IM: CPT | Performed by: PHYSICIAN ASSISTANT

## 2024-05-02 PROCEDURE — 90471 IMMUNIZATION ADMIN: CPT | Performed by: PHYSICIAN ASSISTANT

## 2024-05-02 PROCEDURE — 87491 CHLMYD TRACH DNA AMP PROBE: CPT | Performed by: PHYSICIAN ASSISTANT

## 2024-05-02 PROCEDURE — 99395 PREV VISIT EST AGE 18-39: CPT | Mod: 25 | Performed by: PHYSICIAN ASSISTANT

## 2024-05-02 RX ORDER — NORETHINDRONE ACETATE AND ETHINYL ESTRADIOL 1MG-20(21)
1 KIT ORAL DAILY
Qty: 84 TABLET | Refills: 3 | Status: SHIPPED | OUTPATIENT
Start: 2024-05-02 | End: 2024-05-02

## 2024-05-02 RX ORDER — DIAZEPAM 10 MG
10 TABLET ORAL
Qty: 1 TABLET | Refills: 0 | Status: SHIPPED | OUTPATIENT
Start: 2024-05-02 | End: 2024-07-10

## 2024-05-02 RX ORDER — NORETHINDRONE ACETATE AND ETHINYL ESTRADIOL 1MG-20(21)
1 KIT ORAL DAILY
Qty: 84 TABLET | Refills: 0 | Status: SHIPPED | OUTPATIENT
Start: 2024-05-02 | End: 2024-07-10

## 2024-05-02 NOTE — PROGRESS NOTES
"Preventive Care Visit  [facility] [place]  Magdalena Carson PA-C, Family Medicine  May 2, 2024      Assessment & Plan     Routine general medical examination at a health care facility  - REVIEW OF HEALTH MAINTENANCE PROTOCOL ORDERS  - PRIMARY CARE FOLLOW-UP SCHEDULING    Morbid obesity (H)  Weight stable.  Diet and exercise efforts encouraged    Encounter for surveillance of contraceptive pills  Situational anxiety - surrounding pelvic exam due to PTSD from IUD  Uses OCP regularly.  No STI concerns or pelvic concerns.  Advised that need Pap updated to continue prescribing.  Has had some PTSD after IUD insertion approximately 1 year ago.  Denies any history of sexual trauma.  Advised that we could certainly do this with oral Valium and a .  I have given a single Valium to her and asked her to try to schedule this with me in the next 3 months.  Patient voiced understanding and agreement.  Will certainly revisit this on a case-by-case basis.  - norethindrone-ethinyl estradiol (JUNEL FE 1/20) 1-20 MG-MCG tablet  Dispense: 84 tablet; Refill: 0  - diazepam (VALIUM) 10 MG tablet  Dispense: 1 tablet; Refill: 0    Screening for STDs (sexually transmitted diseases)  Routine screening      Patient has been advised of split billing requirements and indicates understanding: Yes      BMI  Estimated body mass index is 43.08 kg/m  as calculated from the following:    Height as of this encounter: 1.549 m (5' 1\").    Weight as of this encounter: 103.4 kg (228 lb).   Weight management plan: Discussed healthy diet and exercise guidelines    Counseling  Appropriate preventive services were discussed with this patient, including applicable screening as appropriate for fall prevention, nutrition, physical activity, Tobacco-use cessation, weight loss and cognition.  Checklist reviewing preventive services available has been given to the patient.  Reviewed patient's diet, addressing concerns and/or questions.   She " is at risk for lack of exercise and has been provided with information to increase physical activity for the benefit of her well-being.       Return in about 6 weeks (around 6/16/2024) for Pap only (call Gabi ROSS or send mychart if unable to schedule easily).      Magdalena Carson MBA, MS, PA-C  [facility]   Kelsi is a 23 year old, presenting for the following:  Physical        5/2/2024    12:36 PM   Additional Questions   Roomed by Gabi CARLIN   Accompanied by Self        Health Care Directive  Patient does not have a Health Care Directive or Living Will: Discussed advance care planning with patient; however, patient declined at this time.    HPI    Obesity:     Wt Readings from Last 5 Encounters:   05/02/24 103.4 kg (228 lb)   03/21/24 101.2 kg (223 lb)   03/21/24 101.2 kg (223 lb)   01/29/24 101.6 kg (224 lb)   10/18/23 101.7 kg (224 lb 4.8 oz)           5/1/2024   General Health   How would you rate your overall physical health? Good   Feel stress (tense, anxious, or unable to sleep) Not at all         5/1/2024   Nutrition   Three or more servings of calcium each day? Yes   Diet: Other   If other, please elaborate: Dairy Free   How many servings of fruit and vegetables per day? (!) 2-3   How many sweetened beverages each day? 0-1         5/1/2024   Exercise   Days per week of moderate/strenous exercise 3 days   Average minutes spent exercising at this level 60 min         5/1/2024   Social Factors   Frequency of gathering with friends or relatives Once a week   Worry food won't last until get money to buy more No   Food not last or not have enough money for food? No   Do you have housing?  Yes   Are you worried about losing your housing? No   Lack of transportation? No   Unable to get utilities (heat,electricity)? No         5/1/2024   Dental   Dentist two times every year? Yes         5/1/2024   TB Screening   Were you born outside of the [place]? Yes         Today's PHQ-2 Score:       " 1/29/2024     6:21 AM   PHQ-2 ( 1999 [employer])   Q1: Little interest or pleasure in doing things 0   Q2: Feeling down, depressed or hopeless 0   PHQ-2 Score 0   Q1: Little interest or pleasure in doing things Not at all   Q2: Feeling down, depressed or hopeless Not at all   PHQ-2 Score 0         5/1/2024   Substance Use   Alcohol more than 3/day or more than 7/wk No   Do you use any other substances recreationally? (!) ALCOHOL     Social History     Tobacco Use    Smoking status: Never    Smokeless tobacco: Never   Vaping Use    Vaping status: Never Used   Substance Use Topics    Alcohol use: Not Currently     Alcohol/week: 1.0 standard drink of alcohol     Types: 1 Standard drinks or equivalent per week     Comment: seldom - 1-2 montly    Drug use: Never           5/1/2024   STI Screening   New sexual partner(s) since last STI/HIV test? No     History of abnormal Pap smear: NO - age 21-29 PAP every 3 years recommended             5/1/2024   Contraception/Family Planning   Questions about contraception or family planning No        Reviewed and updated as needed this visit by Provider   Tobacco  Allergies  Meds  Problems  Med Hx  Surg Hx  Fam Hx  Soc   Hx Sexual Activity          History reviewed. No pertinent past medical history.  Past Surgical History:   Procedure Laterality Date    NO HISTORY OF SURGERY           Review of Systems  Constitutional, HEENT, cardiovascular, pulmonary, GI, , musculoskeletal, neuro, skin, endocrine and psych systems are negative, except as otherwise noted.     Objective    Exam  /76   Pulse 91   Temp (!) 96.6  F (35.9  C) (Tympanic)   Resp 16   Ht 1.549 m (5' 1\")   Wt 103.4 kg (228 lb)   LMP 04/03/2024 (Approximate)   SpO2 100%   BMI 43.08 kg/m     Estimated body mass index is 43.08 kg/m  as calculated from the following:    Height as of this encounter: 1.549 m (5' 1\").    Weight as of this encounter: 103.4 kg (228 lb).    Physical Exam  GENERAL: alert and no " distress  EYES: Eyes grossly normal to inspection, PERRL and conjunctivae and sclerae normal  HENT: ear canals and TM's normal, nose and mouth without ulcers or lesions  NECK: no adenopathy, no asymmetry, masses, or scars  RESP: lungs clear to auscultation - no rales, rhonchi or wheezes  BREAST: normal without masses, tenderness or nipple discharge and no palpable axillary masses or adenopathy  CV: regular rate and rhythm, normal S1 S2, no S3 or S4, no murmur, click or rub, no peripheral edema  ABDOMEN: soft, nontender, no hepatosplenomegaly, no masses and bowel sounds normal   (female) w/bimanual: normal female external genitalia, normal urethral meatus, normal vaginal mucosa, and normal cervix/adnexa/uterus without masses or discharge  MS: no gross musculoskeletal defects noted, no edema  SKIN: no suspicious lesions or rashes  NEURO: Normal strength and tone, mentation intact and speech normal  PSYCH: mentation appears normal, affect normal/bright        Signed Electronically by: Magdalena Carson PA-C

## 2024-05-02 NOTE — PATIENT INSTRUCTIONS
Preventive Care Advice   This is general advice given by our system to help you stay healthy. However, your care team may have specific advice just for you. Please talk to your care team about your preventive care needs.  Nutrition  Eat 5 or more servings of fruits and vegetables each day.  Try wheat bread, brown rice and whole grain pasta (instead of white bread, rice, and pasta).  Get enough calcium and vitamin D. Check the label on foods and aim for 100% of the RDA (recommended daily allowance).  Lifestyle  Exercise at least 150 minutes each week   (30 minutes a day, 5 days a week).  Do muscle strengthening activities 2 days a week. These help control your weight and prevent disease.  No smoking.  Wear sunscreen to prevent skin cancer.  Have a dental exam and cleaning every 6 months.  Yearly exams  See your health care team every year to talk about:  Any changes in your health.  Any medicines your care team has prescribed.  Preventive care, family planning, and ways to prevent chronic diseases.  Shots (vaccines)   HPV shots (up to age 26), if you've never had them before.  Hepatitis B shots (up to age 59), if you've never had them before.  COVID-19 shot: Get this shot when it's due.  Flu shot: Get a flu shot every year.  Tetanus shot: Get a tetanus shot every 10 years.  Pneumococcal, hepatitis A, and RSV shots: Ask your care team if you need these based on your risk.  Shingles shot (for age 50 and up).  General health tests  Diabetes screening:  Starting at age 35, Get screened for diabetes at least every 3 years.  If you are younger than age 35, ask your care team if you should be screened for diabetes.  Cholesterol test: At age 39, start having a cholesterol test every 5 years, or more often if advised.  Bone density scan (DEXA): At age 50, ask your care team if you should have this scan for osteoporosis (brittle bones).  Hepatitis C: Get tested at least once in your life.  STIs (sexually transmitted  infections)  Before age 24: Ask your care team if you should be screened for STIs.  After age 24: Get screened for STIs if you're at risk. You are at risk for STIs (including HIV) if:  You are sexually active with more than one person.  You don't use condoms every time.  You or a partner was diagnosed with a sexually transmitted infection.  If you are at risk for HIV, ask about PrEP medicine to prevent HIV.  Get tested for HIV at least once in your life, whether you are at risk for HIV or not.  Cancer screening tests  Cervical cancer screening: If you have a cervix, begin getting regular cervical cancer screening tests at age 21. Most people who have regular screenings with normal results can stop after age 65. Talk about this with your provider.  Breast cancer scan (mammogram): If you've ever had breasts, begin having regular mammograms starting at age 40. This is a scan to check for breast cancer.  Colon cancer screening: It is important to start screening for colon cancer at age 45.  Have a colonoscopy test every 10 years (or more often if you're at risk) Or, ask your provider about stool tests like a FIT test every year or Cologuard test every 3 years.  To learn more about your testing options, visit: https://www.[employer]/411087.pdf.  For help making a decision, visit: https://bit.ly/hm22458.  Prostate cancer screening test: If you have a prostate and are age 55 to 69, ask your provider if you would benefit from a yearly prostate cancer screening test.  Lung cancer screening: If you are a current or former smoker age 50 to 80, ask your care team if ongoing lung cancer screenings are right for you.  For informational purposes only. Not to replace the advice of your health care provider. Copyright   2023 [employer]. All rights reserved. Clinically reviewed by the [facility] Transitions Program. [employer] 823426 - REV 01/24.    Substance Use Disorder: Care Instructions  Overview     You can  improve your life and health by stopping your use of alcohol or drugs. When you don't drink or use drugs, you may feel and sleep better. You may get along better with your family, friends, and coworkers. There are medicines and programs that can help with substance use disorder.  How can you care for yourself at home?  Here are some ways to help you stay sober and prevent relapse.  If you have been given medicine to help keep you sober or reduce your cravings, be sure to take it exactly as prescribed.  Talk to your doctor about programs that can help you stop using drugs or drinking alcohol.  Do not keep alcohol or drugs in your home.  Plan ahead. Think about what you'll say if other people ask you to drink or use drugs. Try not to spend time with people who drink or use drugs.  Use the time and money spent on drinking or drugs to do something that's important to you.  Preventing a relapse  Have a plan to deal with relapse. Learn to recognize changes in your thinking that lead you to drink or use drugs. Get help before you start to drink or use drugs again.  Try to stay away from situations, friends, or places that may lead you to drink or use drugs.  If you feel the need to drink alcohol or use drugs again, seek help right away. Call a trusted friend or family member. Some people get support from organizations such as Narcotics Anonymous or [employer] or from treatment facilities.  If you relapse, get help as soon as you can. Some people make a plan with another person that outlines what they want that person to do for them if they relapse. The plan usually includes how to handle the relapse and who to notify in case of relapse.  Don't give up. Remember that a relapse doesn't mean that you have failed. Use the experience to learn the triggers that lead you to drink or use drugs. Then quit again. Recovery is a lifelong process. Many people have several relapses before they are able to quit for good.  Follow-up  "care is a lloyd part of your treatment and safety. Be sure to make and go to all appointments, and call your doctor if you are having problems. It's also a good idea to know your test results and keep a list of the medicines you take.  When should you call for help?   Call 911  anytime you think you may need emergency care. For example, call if you or someone else:    Has overdosed or has withdrawal signs. Be sure to tell the emergency workers that you are or someone else is using or trying to quit using drugs. Overdose or withdrawal signs may include:  Losing consciousness.  Seizure.  Seeing or hearing things that aren't there (hallucinations).     Is thinking or talking about suicide or harming others.   Where to get help 24 hours a day, 7 days a week   If you or someone you know talks about suicide, self-harm, a mental health crisis, a substance use crisis, or any other kind of emotional distress, get help right away. You can:    Call the Suicide and Crisis Lifeline at 988.     Call 2-341-213-EQFM (1-203.399.5092).     Text HOME to 441612 to access the Crisis Text Line.   Consider saving these numbers in your phone.  Go to [employer] for more information or to chat online.  Call your doctor now or seek immediate medical care if:    You are having withdrawal symptoms. These may include nausea or vomiting, sweating, shakiness, and anxiety.   Watch closely for changes in your health, and be sure to contact your doctor if:    You have a relapse.     You need more help or support to stop.   Where can you learn more?  Go to https://www.healthsemanticlabs.net/patiented  Enter H573 in the search box to learn more about \"Substance Use Disorder: Care Instructions.\"  Current as of: November 15, 2023               Content Version: 14.0    3005-3486 [employer].   Care instructions adapted under license by your healthcare professional. If you have questions about a medical condition or this instruction, always ask " your healthcare professional. Healthwise, Incorporated disclaims any warranty or liability for your use of this information.

## 2024-05-03 LAB — C TRACH DNA SPEC QL NAA+PROBE: NEGATIVE

## 2024-05-04 NOTE — RESULT ENCOUNTER NOTE
Kelsi  I have reviewed your recent test results:    -Chlamydia test is normal.    For additional lab test information, www.testing.com is an excellent reference.     If you have any questions please do not hesitate to contact our office via phone ([phone]) or [email or web address].    Healthy regards,     Magdalena Carson MBA, MS, PA-C  [facility]

## 2024-05-23 ENCOUNTER — TELEPHONE (OUTPATIENT)
Dept: FAMILY MEDICINE | Facility: CLINIC | Age: 24
End: 2024-05-23

## 2024-05-23 DIAGNOSIS — Z30.41 ENCOUNTER FOR SURVEILLANCE OF CONTRACEPTIVE PILLS: Primary | ICD-10-CM

## 2024-05-23 DIAGNOSIS — F41.8 SITUATIONAL ANXIETY: ICD-10-CM

## 2024-05-23 NOTE — TELEPHONE ENCOUNTER
Please send information re: referral to patient via mychart.      Magdalena Carson MBA, MS, PA-C  [facility]

## 2024-05-23 NOTE — TELEPHONE ENCOUNTER
Patient would like a referral to OB clinic for care to continue her birth control medication where she does not need to do a yearly pap screening. Patient was advised we do request a PAP screening for safety although not technically required for birth control. It is a preference in practice for overall safety. Patient did not agree with this and would like a bridge of her refill and a referral to an OB clinic nearby. I did advise the patient she would need to call any location we refer her to and ask which provider would be willing to do this for her as well with no pap screenings.    Pt understood and agreed to this bridge plan. [facility]    Thank you    Jaquelin Weaver  Clinic Manager

## 2024-07-10 ENCOUNTER — MYC MEDICAL ADVICE (OUTPATIENT)
Dept: FAMILY MEDICINE | Facility: CLINIC | Age: 24
End: 2024-07-10
Payer: COMMERCIAL

## 2024-07-10 DIAGNOSIS — Z30.41 ENCOUNTER FOR SURVEILLANCE OF CONTRACEPTIVE PILLS: ICD-10-CM

## 2024-07-10 RX ORDER — NORETHINDRONE ACETATE AND ETHINYL ESTRADIOL 1MG-20(21)
1 KIT ORAL DAILY
Qty: 84 TABLET | Refills: 0 | Status: SHIPPED | OUTPATIENT
Start: 2024-07-10 | End: 2024-08-08

## 2024-08-08 ENCOUNTER — OFFICE VISIT (OUTPATIENT)
Dept: OBGYN | Facility: CLINIC | Age: 24
End: 2024-08-08
Attending: PHYSICIAN ASSISTANT
Payer: COMMERCIAL

## 2024-08-08 VITALS — WEIGHT: 226.3 LBS | BODY MASS INDEX: 42.76 KG/M2 | SYSTOLIC BLOOD PRESSURE: 118 MMHG | DIASTOLIC BLOOD PRESSURE: 74 MMHG

## 2024-08-08 DIAGNOSIS — Z30.41 ENCOUNTER FOR SURVEILLANCE OF CONTRACEPTIVE PILLS: ICD-10-CM

## 2024-08-08 DIAGNOSIS — F41.8 SITUATIONAL ANXIETY: ICD-10-CM

## 2024-08-08 PROCEDURE — 99203 OFFICE O/P NEW LOW 30 MIN: CPT | Performed by: OBSTETRICS & GYNECOLOGY

## 2024-08-08 RX ORDER — NORETHINDRONE ACETATE AND ETHINYL ESTRADIOL 1MG-20(21)
1 KIT ORAL DAILY
Qty: 84 TABLET | Refills: 4 | Status: SHIPPED | OUTPATIENT
Start: 2024-08-08

## 2024-08-08 NOTE — PROGRESS NOTES
SUBJECTIVE:   CC: contraceptive surveillance                                                Kelsi Bennett is a 23 year old  female who presents to clinic today for refill of oral contraceptives. She is also due for a pap smear but adamantly refuses due to hx of a traumatic IUD insertion attempt.    Problem list and histories reviewed & adjusted, as indicated.  Additional history: as documented.    ROS:  Heme: no history of blood clots  Neuro: no history of migraine or aura    Does not smoke     Patient Active Problem List   Diagnosis    Morbid obesity (H)    Adopted person    Encounter for surveillance of contraceptive pills    Situational anxiety - surrounding pelvic exam due to PTSD from IUD     Past Surgical History:   Procedure Laterality Date    NO HISTORY OF SURGERY        Social History     Tobacco Use    Smoking status: Never    Smokeless tobacco: Never   Substance Use Topics    Alcohol use: Not Currently     Alcohol/week: 1.0 standard drink of alcohol     Types: 1 Standard drinks or equivalent per week     Comment: seldom - 1-2 montly      *Patient is Adopted           Problem (# of Occurrences) Relation (Name,Age of Onset)    No Known Problems (6) Father (adopeted), Mother (adopted), Maternal Grandmother, Maternal Grandfather, Paternal Grandmother, Other              Current Outpatient Medications   Medication Sig Dispense Refill    norethindrone-ethinyl estradiol (JUNEL FE 1/20) 1-20 MG-MCG tablet Take 1 tablet by mouth daily 84 tablet 4     No current facility-administered medications for this visit.     No Known Allergies    OBJECTIVE:   /74   Wt 102.6 kg (226 lb 4.8 oz)   LMP 2024 (Approximate)   BMI 42.76 kg/m     Const: sitting in chair in no acute distress, comfortable  Eyes: no scleral icterus, EOMI  CV: regular rate, well perfused  Pulm: no increased work of breathing, no cough  Skin: warm and dry, no rashes/lesions  Psych: mood stable, appropriate affect  Abd: soft, no  hepatosplenomegaly, non-tender to palpation  Neuro: A+Ox3   : declined by patient    ASSESSMENT/PLAN:                                                    Kelsi Bennett is a 23 year old female  here to establish care for contraceptive refill. She does not have any contraindications to oral contraceptives.     1. Encounter for surveillance of contraceptive pills  - Ob/Gyn  Referral  - norethindrone-ethinyl estradiol (JUNEL FE 1/20) 1-20 MG-MCG tablet; Take 1 tablet by mouth daily  Dispense: 84 tablet; Refill: 4    2. Situational anxiety  - reviewed options to complete her indicated pap smear, including ativan prior to appointment, exam under sedating in an outpatient setting, or exam under anesthesia in the OR. She declines all options and declines to discuss further.           Sandy Boone MD  Obstetrics and Gynecology   [facility] [place]

## 2025-03-24 ENCOUNTER — E-VISIT (OUTPATIENT)
Dept: URGENT CARE | Facility: CLINIC | Age: 25
End: 2025-03-24
Payer: COMMERCIAL

## 2025-03-24 DIAGNOSIS — J01.90 ACUTE BACTERIAL SINUSITIS: Primary | ICD-10-CM

## 2025-03-24 DIAGNOSIS — B96.89 ACUTE BACTERIAL SINUSITIS: Primary | ICD-10-CM

## 2025-03-24 PROCEDURE — 99207 PR NON-BILLABLE SERV PER CHARTING: CPT | Performed by: EMERGENCY MEDICINE

## 2025-03-24 NOTE — PATIENT INSTRUCTIONS
Acute Sinusitis: Care Instructions  Overview     Acute sinusitis is an inflammation of the mucous membranes inside the nose and sinuses. Sinuses are the hollow spaces in your skull around the eyes and nose. Acute sinusitis often follows a cold. Acute sinusitis causes thick, discolored mucus that drains from the nose or down the back of the throat. It also can cause pain and pressure in your head and face along with a stuffy or blocked nose.  In most cases, sinusitis gets better on its own in 1 to 2 weeks. But some mild symptoms may last for several weeks. Sometimes antibiotics are needed if there is a bacterial infection.  Follow-up care is a key part of your treatment and safety. Be sure to make and go to all appointments, and call your doctor if you are having problems. It's also a good idea to know your test results and keep a list of the medicines you take.  How can you care for yourself at home?  Use saline (saltwater) nasal washes. This can help keep your nasal passages open and wash out mucus and allergens.  You can buy saline nose washes at a grocery store or drugstore. Follow the instructions on the package.  You can make your own at home. Add 1 teaspoon of non-iodized salt and 1 teaspoon of baking soda to 2 cups of distilled or boiled and cooled water. Fill a squeeze bottle or a nasal cleansing pot (such as a neti pot) with the nasal wash. Then put the tip into your nostril, and lean over the sink. With your mouth open, gently squirt the liquid. Repeat on the other side.  Try a decongestant nasal spray like oxymetazoline (Afrin). Do not use it for more than 3 days in a row. Using it for more than 3 days can make your congestion worse.  If needed, take an over-the-counter pain medicine, such as acetaminophen (Tylenol), ibuprofen (Advil, Motrin), or naproxen (Aleve). Read and follow all instructions on the label.  If the doctor prescribed antibiotics, take them as directed. Do not stop taking them just  "because you feel better. You need to take the full course of antibiotics.  Be careful when taking over-the-counter cold or flu medicines and Tylenol at the same time. Many of these medicines have acetaminophen, which is Tylenol. Read the labels to make sure that you are not taking more than the recommended dose. Too much acetaminophen (Tylenol) can be harmful.  Try a steroid nasal spray. It may help with your symptoms.  Breathe warm, moist air. You can use a steamy shower, a hot bath, or a sink filled with hot water. Avoid cold, dry air. Using a humidifier in your home may help. Follow the directions for cleaning the machine.  When should you call for help?   Call your doctor now or seek immediate medical care if:    You have new or worse swelling, redness, or pain in your face or around one or both of your eyes.     You have double vision or a change in your vision.     You have a high fever.     You have a severe headache and a stiff neck.     You have mental changes, such as feeling confused or much less alert.   Watch closely for changes in your health, and be sure to contact your doctor if:    You are not getting better as expected.   Where can you learn more?  Go to https://www.[employer].net/patiented  Enter I933 in the search box to learn more about \"Acute Sinusitis: Care Instructions.\"  Current as of: October 27, 2024  Content Version: 14.4    7611-9905 [employer].   Care instructions adapted under license by your healthcare professional. If you have questions about a medical condition or this instruction, always ask your healthcare professional. [employer] disclaims any warranty or liability for your use of this information.    Dear Kelsi Bennett       Based on your responses and diagnosis, I have prescribed Augmentin  to treat your symptoms. I have sent this to your pharmacy.?     It is also important to stay well hydrated, get lots of rest and take over-the-counter " decongestants,?tylenol?or ibuprofen if you?are able to?take those medications per your primary care provider to help relieve discomfort.?     It is important that you take?all of?your prescribed medication even if your symptoms are improving after a few doses.? Taking?all of?your medicine helps prevent the symptoms from returning.?     If your symptoms worsen, you develop severe headache, vomiting, high fever (>102), or are not improving in 7 days, please contact your primary care provider for an appointment or visit any of our convenient Walk-in Care or Urgent Care Centers to be seen which can be found on our website?here.?     Thanks again for choosing?us?as your health care partner,?   ?  Mekhi Youngblood MD?   Thank you for choosing us for your care. I have placed an order for a prescription so that you can start treatment:  Orders Placed This Encounter   Medications     amoxicillin-clavulanate (AUGMENTIN) 875-125 MG tablet     Sig: Take 1 tablet by mouth 2 times daily for 7 days.     Dispense:  14 tablet     Refill:  0          View your full visit summary for details by clicking on the link below. Your pharmacist will able to address any questions you may have about the medication.     If you're not feeling better within 5-7 days, please schedule an appointment.  You can schedule an appointment right here in [facility], or call [phone]  If the visit is for the same symptoms as your eVisit, we'll refund the cost of your eVisit if seen within seven days.

## 2025-04-02 ENCOUNTER — PATIENT OUTREACH (OUTPATIENT)
Dept: CARE COORDINATION | Facility: CLINIC | Age: 25
End: 2025-04-02
Payer: COMMERCIAL

## 2025-04-16 ENCOUNTER — PATIENT OUTREACH (OUTPATIENT)
Dept: CARE COORDINATION | Facility: CLINIC | Age: 25
End: 2025-04-16
Payer: COMMERCIAL

## 2025-06-21 ENCOUNTER — HEALTH MAINTENANCE LETTER (OUTPATIENT)
Age: 25
End: 2025-06-21

## 2025-08-12 ENCOUNTER — OFFICE VISIT (OUTPATIENT)
Dept: OBGYN | Facility: CLINIC | Age: 25
End: 2025-08-12
Payer: COMMERCIAL

## 2025-08-12 VITALS — WEIGHT: 227.9 LBS | SYSTOLIC BLOOD PRESSURE: 106 MMHG | DIASTOLIC BLOOD PRESSURE: 70 MMHG | BODY MASS INDEX: 43.06 KG/M2

## 2025-08-12 DIAGNOSIS — Z30.41 ENCOUNTER FOR SURVEILLANCE OF CONTRACEPTIVE PILLS: ICD-10-CM

## 2025-08-12 PROCEDURE — 99213 OFFICE O/P EST LOW 20 MIN: CPT | Performed by: OBSTETRICS & GYNECOLOGY

## 2025-08-12 PROCEDURE — 3078F DIAST BP <80 MM HG: CPT | Performed by: OBSTETRICS & GYNECOLOGY

## 2025-08-12 PROCEDURE — 3074F SYST BP LT 130 MM HG: CPT | Performed by: OBSTETRICS & GYNECOLOGY

## 2025-08-12 RX ORDER — NORETHINDRONE ACETATE AND ETHINYL ESTRADIOL 1MG-20(21)
1 KIT ORAL DAILY
Qty: 84 TABLET | Refills: 4 | Status: SHIPPED | OUTPATIENT
Start: 2025-08-12